# Patient Record
Sex: MALE | Race: WHITE | NOT HISPANIC OR LATINO | URBAN - METROPOLITAN AREA
[De-identification: names, ages, dates, MRNs, and addresses within clinical notes are randomized per-mention and may not be internally consistent; named-entity substitution may affect disease eponyms.]

---

## 2017-03-08 ENCOUNTER — INPATIENT (INPATIENT)
Facility: HOSPITAL | Age: 72
LOS: 4 days | Discharge: HOME | End: 2017-03-13
Payer: MEDICARE

## 2017-03-08 ENCOUNTER — OUTPATIENT (OUTPATIENT)
Dept: OUTPATIENT SERVICES | Facility: HOSPITAL | Age: 72
LOS: 1 days | End: 2017-03-08

## 2017-03-08 PROCEDURE — 99284 EMERGENCY DEPT VISIT MOD MDM: CPT

## 2017-03-08 PROCEDURE — 71250 CT THORAX DX C-: CPT | Mod: 26

## 2017-03-08 PROCEDURE — 71020: CPT | Mod: 26

## 2017-03-09 ENCOUNTER — OUTPATIENT (OUTPATIENT)
Dept: OUTPATIENT SERVICES | Facility: HOSPITAL | Age: 72
LOS: 1 days | End: 2017-03-09

## 2017-03-10 ENCOUNTER — OUTPATIENT (OUTPATIENT)
Dept: OUTPATIENT SERVICES | Facility: HOSPITAL | Age: 72
LOS: 1 days | End: 2017-03-10

## 2017-03-11 ENCOUNTER — OUTPATIENT (OUTPATIENT)
Dept: OUTPATIENT SERVICES | Facility: HOSPITAL | Age: 72
LOS: 1 days | End: 2017-03-11

## 2017-03-12 ENCOUNTER — OUTPATIENT (OUTPATIENT)
Dept: OUTPATIENT SERVICES | Facility: HOSPITAL | Age: 72
LOS: 1 days | End: 2017-03-12

## 2017-03-12 PROCEDURE — 71020: CPT | Mod: 26

## 2017-03-13 ENCOUNTER — OUTPATIENT (OUTPATIENT)
Dept: OUTPATIENT SERVICES | Facility: HOSPITAL | Age: 72
LOS: 1 days | End: 2017-03-13

## 2017-11-29 PROCEDURE — 71020: CPT | Mod: 26

## 2017-11-29 PROCEDURE — 99284 EMERGENCY DEPT VISIT MOD MDM: CPT

## 2017-11-30 ENCOUNTER — INPATIENT (INPATIENT)
Facility: HOSPITAL | Age: 72
LOS: 5 days | Discharge: NURSING, REHAB OR LONG-TERM CARE | End: 2017-12-06
Payer: MEDICARE

## 2017-11-30 ENCOUNTER — OUTPATIENT (OUTPATIENT)
Dept: OUTPATIENT SERVICES | Facility: HOSPITAL | Age: 72
LOS: 1 days | End: 2017-11-30

## 2017-11-30 PROCEDURE — 70450 CT HEAD/BRAIN W/O DYE: CPT | Mod: 26

## 2017-12-01 ENCOUNTER — OUTPATIENT (OUTPATIENT)
Dept: OUTPATIENT SERVICES | Facility: HOSPITAL | Age: 72
LOS: 1 days | End: 2017-12-01

## 2017-12-02 ENCOUNTER — OUTPATIENT (OUTPATIENT)
Dept: OUTPATIENT SERVICES | Facility: HOSPITAL | Age: 72
LOS: 1 days | End: 2017-12-02

## 2017-12-03 ENCOUNTER — OUTPATIENT (OUTPATIENT)
Dept: OUTPATIENT SERVICES | Facility: HOSPITAL | Age: 72
LOS: 1 days | End: 2017-12-03

## 2017-12-04 ENCOUNTER — OUTPATIENT (OUTPATIENT)
Dept: OUTPATIENT SERVICES | Facility: HOSPITAL | Age: 72
LOS: 1 days | End: 2017-12-04

## 2017-12-05 ENCOUNTER — OUTPATIENT (OUTPATIENT)
Dept: OUTPATIENT SERVICES | Facility: HOSPITAL | Age: 72
LOS: 1 days | End: 2017-12-05

## 2017-12-05 PROCEDURE — 74230 X-RAY XM SWLNG FUNCJ C+: CPT | Mod: 26

## 2017-12-06 ENCOUNTER — OUTPATIENT (OUTPATIENT)
Dept: OUTPATIENT SERVICES | Facility: HOSPITAL | Age: 72
LOS: 1 days | End: 2017-12-06

## 2018-04-22 ENCOUNTER — OUTPATIENT (OUTPATIENT)
Dept: OUTPATIENT SERVICES | Facility: HOSPITAL | Age: 73
LOS: 1 days | End: 2018-04-22

## 2018-04-22 PROCEDURE — 99285 EMERGENCY DEPT VISIT HI MDM: CPT

## 2018-04-22 PROCEDURE — 71045 X-RAY EXAM CHEST 1 VIEW: CPT | Mod: 26

## 2018-04-22 PROCEDURE — 70450 CT HEAD/BRAIN W/O DYE: CPT | Mod: 26

## 2018-04-22 PROCEDURE — 76700 US EXAM ABDOM COMPLETE: CPT | Mod: 26

## 2018-04-23 ENCOUNTER — OUTPATIENT (OUTPATIENT)
Dept: OUTPATIENT SERVICES | Facility: HOSPITAL | Age: 73
LOS: 1 days | End: 2018-04-23

## 2018-04-23 ENCOUNTER — INPATIENT (INPATIENT)
Facility: HOSPITAL | Age: 73
LOS: 8 days | Discharge: NURSING, REHAB OR LONG-TERM CARE | End: 2018-05-02
Payer: MEDICARE

## 2018-04-24 ENCOUNTER — OUTPATIENT (OUTPATIENT)
Dept: OUTPATIENT SERVICES | Facility: HOSPITAL | Age: 73
LOS: 1 days | End: 2018-04-24

## 2018-04-25 ENCOUNTER — OUTPATIENT (OUTPATIENT)
Dept: OUTPATIENT SERVICES | Facility: HOSPITAL | Age: 73
LOS: 1 days | End: 2018-04-25

## 2018-04-26 ENCOUNTER — OUTPATIENT (OUTPATIENT)
Dept: OUTPATIENT SERVICES | Facility: HOSPITAL | Age: 73
LOS: 1 days | End: 2018-04-26

## 2018-04-26 PROCEDURE — 74230 X-RAY XM SWLNG FUNCJ C+: CPT | Mod: 26

## 2018-04-27 ENCOUNTER — OUTPATIENT (OUTPATIENT)
Dept: OUTPATIENT SERVICES | Facility: HOSPITAL | Age: 73
LOS: 1 days | End: 2018-04-27

## 2018-04-27 PROCEDURE — 76856 US EXAM PELVIC COMPLETE: CPT | Mod: 26

## 2018-04-27 PROCEDURE — 71045 X-RAY EXAM CHEST 1 VIEW: CPT | Mod: 26

## 2018-04-27 PROCEDURE — 76770 US EXAM ABDO BACK WALL COMP: CPT | Mod: 26

## 2018-04-28 ENCOUNTER — OUTPATIENT (OUTPATIENT)
Dept: OUTPATIENT SERVICES | Facility: HOSPITAL | Age: 73
LOS: 1 days | End: 2018-04-28

## 2018-04-28 PROCEDURE — 73110 X-RAY EXAM OF WRIST: CPT | Mod: 26,LT

## 2018-04-29 ENCOUNTER — OUTPATIENT (OUTPATIENT)
Dept: OUTPATIENT SERVICES | Facility: HOSPITAL | Age: 73
LOS: 1 days | End: 2018-04-29

## 2018-04-30 ENCOUNTER — OUTPATIENT (OUTPATIENT)
Dept: OUTPATIENT SERVICES | Facility: HOSPITAL | Age: 73
LOS: 1 days | End: 2018-04-30

## 2018-05-01 ENCOUNTER — OUTPATIENT (OUTPATIENT)
Dept: OUTPATIENT SERVICES | Facility: HOSPITAL | Age: 73
LOS: 1 days | End: 2018-05-01

## 2018-05-02 ENCOUNTER — OUTPATIENT (OUTPATIENT)
Dept: OUTPATIENT SERVICES | Facility: HOSPITAL | Age: 73
LOS: 1 days | End: 2018-05-02

## 2018-09-03 ENCOUNTER — OUTPATIENT (OUTPATIENT)
Dept: OUTPATIENT SERVICES | Facility: HOSPITAL | Age: 73
LOS: 1 days | End: 2018-09-03

## 2018-09-03 ENCOUNTER — INPATIENT (INPATIENT)
Facility: HOSPITAL | Age: 73
LOS: 7 days | Discharge: NURSING, REHAB OR LONG-TERM CARE | End: 2018-09-11
Payer: MEDICARE

## 2018-09-03 PROCEDURE — 99285 EMERGENCY DEPT VISIT HI MDM: CPT

## 2018-09-03 PROCEDURE — 71045 X-RAY EXAM CHEST 1 VIEW: CPT | Mod: 26

## 2018-09-04 ENCOUNTER — OUTPATIENT (OUTPATIENT)
Dept: OUTPATIENT SERVICES | Facility: HOSPITAL | Age: 73
LOS: 1 days | End: 2018-09-04

## 2018-09-04 PROCEDURE — 70450 CT HEAD/BRAIN W/O DYE: CPT | Mod: 26

## 2018-09-05 ENCOUNTER — OUTPATIENT (OUTPATIENT)
Dept: OUTPATIENT SERVICES | Facility: HOSPITAL | Age: 73
LOS: 1 days | End: 2018-09-05

## 2018-09-06 ENCOUNTER — OUTPATIENT (OUTPATIENT)
Dept: OUTPATIENT SERVICES | Facility: HOSPITAL | Age: 73
LOS: 1 days | End: 2018-09-06

## 2018-09-07 ENCOUNTER — OUTPATIENT (OUTPATIENT)
Dept: OUTPATIENT SERVICES | Facility: HOSPITAL | Age: 73
LOS: 1 days | End: 2018-09-07

## 2018-09-08 ENCOUNTER — OUTPATIENT (OUTPATIENT)
Dept: OUTPATIENT SERVICES | Facility: HOSPITAL | Age: 73
LOS: 1 days | End: 2018-09-08

## 2018-09-08 PROCEDURE — 71046 X-RAY EXAM CHEST 2 VIEWS: CPT | Mod: 26

## 2018-09-09 ENCOUNTER — OUTPATIENT (OUTPATIENT)
Dept: OUTPATIENT SERVICES | Facility: HOSPITAL | Age: 73
LOS: 1 days | End: 2018-09-09

## 2018-09-10 ENCOUNTER — OUTPATIENT (OUTPATIENT)
Dept: OUTPATIENT SERVICES | Facility: HOSPITAL | Age: 73
LOS: 1 days | End: 2018-09-10

## 2018-11-23 ENCOUNTER — INPATIENT (INPATIENT)
Facility: HOSPITAL | Age: 73
LOS: 5 days | Discharge: NURSING, REHAB OR LONG-TERM CARE | End: 2018-11-29
Admitting: INTERNAL MEDICINE
Payer: MEDICARE

## 2018-11-23 ENCOUNTER — OUTPATIENT (OUTPATIENT)
Dept: OUTPATIENT SERVICES | Facility: HOSPITAL | Age: 73
LOS: 1 days | End: 2018-11-23

## 2018-11-23 PROCEDURE — 99285 EMERGENCY DEPT VISIT HI MDM: CPT

## 2018-11-23 PROCEDURE — 71045 X-RAY EXAM CHEST 1 VIEW: CPT | Mod: 26

## 2018-11-23 PROCEDURE — 70450 CT HEAD/BRAIN W/O DYE: CPT | Mod: 26

## 2018-11-23 PROCEDURE — 72170 X-RAY EXAM OF PELVIS: CPT | Mod: 26

## 2018-11-24 ENCOUNTER — OUTPATIENT (OUTPATIENT)
Dept: OUTPATIENT SERVICES | Facility: HOSPITAL | Age: 73
LOS: 1 days | End: 2018-11-24

## 2018-11-25 ENCOUNTER — OUTPATIENT (OUTPATIENT)
Dept: OUTPATIENT SERVICES | Facility: HOSPITAL | Age: 73
LOS: 1 days | End: 2018-11-25

## 2018-11-26 ENCOUNTER — OUTPATIENT (OUTPATIENT)
Dept: OUTPATIENT SERVICES | Facility: HOSPITAL | Age: 73
LOS: 1 days | End: 2018-11-26

## 2018-11-26 PROCEDURE — 76857 US EXAM PELVIC LIMITED: CPT | Mod: 26

## 2018-11-27 ENCOUNTER — OUTPATIENT (OUTPATIENT)
Dept: OUTPATIENT SERVICES | Facility: HOSPITAL | Age: 73
LOS: 1 days | End: 2018-11-27

## 2018-11-28 ENCOUNTER — OUTPATIENT (OUTPATIENT)
Dept: OUTPATIENT SERVICES | Facility: HOSPITAL | Age: 73
LOS: 1 days | End: 2018-11-28

## 2018-11-29 ENCOUNTER — OUTPATIENT (OUTPATIENT)
Dept: OUTPATIENT SERVICES | Facility: HOSPITAL | Age: 73
LOS: 1 days | End: 2018-11-29

## 2019-01-01 ENCOUNTER — INPATIENT (INPATIENT)
Facility: HOSPITAL | Age: 74
LOS: 7 days | Discharge: NURSING, REHAB OR LONG-TERM CARE | End: 2019-05-29
Payer: MEDICARE

## 2019-01-01 ENCOUNTER — OUTPATIENT (OUTPATIENT)
Dept: OUTPATIENT SERVICES | Facility: HOSPITAL | Age: 74
LOS: 1 days | End: 2019-01-01

## 2019-01-01 ENCOUNTER — INPATIENT (INPATIENT)
Facility: HOSPITAL | Age: 74
LOS: 3 days | Discharge: NURSING, REHAB OR LONG-TERM CARE | End: 2019-08-13
Payer: MEDICARE

## 2019-01-01 ENCOUNTER — INPATIENT (INPATIENT)
Facility: HOSPITAL | Age: 74
LOS: 8 days | Discharge: NURSING, REHAB OR LONG-TERM CARE | End: 2020-01-02
Admitting: STUDENT IN AN ORGANIZED HEALTH CARE EDUCATION/TRAINING PROGRAM
Payer: MEDICARE

## 2019-01-01 DIAGNOSIS — Z98.890 OTHER SPECIFIED POSTPROCEDURAL STATES: Chronic | ICD-10-CM

## 2019-01-01 PROCEDURE — 70450 CT HEAD/BRAIN W/O DYE: CPT | Mod: 26

## 2019-01-01 PROCEDURE — 99285 EMERGENCY DEPT VISIT HI MDM: CPT

## 2019-01-01 PROCEDURE — 71260 CT THORAX DX C+: CPT | Mod: 26

## 2019-01-01 PROCEDURE — 74230 X-RAY XM SWLNG FUNCJ C+: CPT | Mod: 26

## 2019-01-01 PROCEDURE — 93010 ELECTROCARDIOGRAM REPORT: CPT

## 2019-01-01 PROCEDURE — 72170 X-RAY EXAM OF PELVIS: CPT | Mod: 26

## 2019-01-01 PROCEDURE — 74177 CT ABD & PELVIS W/CONTRAST: CPT | Mod: 26

## 2019-01-01 PROCEDURE — 71275 CT ANGIOGRAPHY CHEST: CPT | Mod: 26

## 2019-01-01 PROCEDURE — 71046 X-RAY EXAM CHEST 2 VIEWS: CPT | Mod: 26

## 2019-01-01 PROCEDURE — 76705 ECHO EXAM OF ABDOMEN: CPT | Mod: 26

## 2019-01-01 PROCEDURE — 71045 X-RAY EXAM CHEST 1 VIEW: CPT | Mod: 26

## 2019-01-01 PROCEDURE — 72125 CT NECK SPINE W/O DYE: CPT | Mod: 26

## 2019-03-05 PROCEDURE — 71045 X-RAY EXAM CHEST 1 VIEW: CPT | Mod: 26

## 2019-03-05 PROCEDURE — 99285 EMERGENCY DEPT VISIT HI MDM: CPT

## 2019-03-05 PROCEDURE — 72125 CT NECK SPINE W/O DYE: CPT | Mod: 26

## 2019-03-05 PROCEDURE — 70450 CT HEAD/BRAIN W/O DYE: CPT | Mod: 26

## 2019-03-05 PROCEDURE — 73502 X-RAY EXAM HIP UNI 2-3 VIEWS: CPT | Mod: 26,RT

## 2019-03-05 PROCEDURE — 72192 CT PELVIS W/O DYE: CPT | Mod: 26

## 2019-03-06 ENCOUNTER — INPATIENT (INPATIENT)
Facility: HOSPITAL | Age: 74
LOS: 7 days | Discharge: NURSING, REHAB OR LONG-TERM CARE | DRG: 853 | End: 2019-03-14
Attending: SURGERY | Admitting: SURGERY
Payer: MEDICARE

## 2019-03-06 ENCOUNTER — INPATIENT (INPATIENT)
Facility: HOSPITAL | Age: 74
LOS: 0 days | Discharge: ANOTHER HOSPITAL | End: 2019-03-06
Attending: SURGERY | Admitting: SURGERY
Payer: MEDICARE

## 2019-03-06 ENCOUNTER — OUTPATIENT (OUTPATIENT)
Dept: OUTPATIENT SERVICES | Facility: HOSPITAL | Age: 74
LOS: 1 days | End: 2019-03-06

## 2019-03-06 VITALS
OXYGEN SATURATION: 92 % | DIASTOLIC BLOOD PRESSURE: 69 MMHG | TEMPERATURE: 98 F | HEART RATE: 89 BPM | SYSTOLIC BLOOD PRESSURE: 126 MMHG | RESPIRATION RATE: 25 BRPM

## 2019-03-06 DIAGNOSIS — A41.9 SEPSIS, UNSPECIFIED ORGANISM: ICD-10-CM

## 2019-03-06 DIAGNOSIS — Z98.890 OTHER SPECIFIED POSTPROCEDURAL STATES: Chronic | ICD-10-CM

## 2019-03-06 LAB
ALBUMIN SERPL ELPH-MCNC: 3.2 G/DL — LOW (ref 3.3–5.2)
ALP SERPL-CCNC: 62 U/L — SIGNIFICANT CHANGE UP (ref 40–120)
ALT FLD-CCNC: 23 U/L — SIGNIFICANT CHANGE UP
ANION GAP SERPL CALC-SCNC: 12 MMOL/L — SIGNIFICANT CHANGE UP (ref 5–17)
APTT BLD: 25.3 SEC — LOW (ref 27.5–36.3)
AST SERPL-CCNC: 42 U/L — HIGH
BASOPHILS # BLD AUTO: 0 K/UL — SIGNIFICANT CHANGE UP (ref 0–0.2)
BASOPHILS NFR BLD AUTO: 0.1 % — SIGNIFICANT CHANGE UP (ref 0–2)
BILIRUB DIRECT SERPL-MCNC: 0.4 MG/DL — HIGH (ref 0–0.3)
BILIRUB INDIRECT FLD-MCNC: 1 MG/DL — SIGNIFICANT CHANGE UP (ref 0.2–1)
BILIRUB SERPL-MCNC: 1.4 MG/DL — SIGNIFICANT CHANGE UP (ref 0.4–2)
BUN SERPL-MCNC: 30 MG/DL — HIGH (ref 8–20)
CALCIUM SERPL-MCNC: 8.2 MG/DL — LOW (ref 8.6–10.2)
CHLORIDE SERPL-SCNC: 102 MMOL/L — SIGNIFICANT CHANGE UP (ref 98–107)
CK MB CFR SERPL CALC: 10.1 NG/ML — HIGH (ref 0–6.7)
CK SERPL-CCNC: 691 U/L — HIGH (ref 30–200)
CO2 SERPL-SCNC: 24 MMOL/L — SIGNIFICANT CHANGE UP (ref 22–29)
CREAT SERPL-MCNC: 0.52 MG/DL — SIGNIFICANT CHANGE UP (ref 0.5–1.3)
EOSINOPHIL # BLD AUTO: 0 K/UL — SIGNIFICANT CHANGE UP (ref 0–0.5)
EOSINOPHIL NFR BLD AUTO: 0 % — SIGNIFICANT CHANGE UP (ref 0–5)
GAS PNL BLDA: SIGNIFICANT CHANGE UP
GLUCOSE SERPL-MCNC: 140 MG/DL — HIGH (ref 70–115)
HCT VFR BLD CALC: 34.3 % — LOW (ref 42–52)
HGB BLD-MCNC: 11.6 G/DL — LOW (ref 14–18)
INR BLD: 0.89 RATIO — SIGNIFICANT CHANGE UP (ref 0.88–1.16)
LYMPHOCYTES # BLD AUTO: 0.8 K/UL — LOW (ref 1–4.8)
LYMPHOCYTES # BLD AUTO: 8.1 % — LOW (ref 20–55)
MAGNESIUM SERPL-MCNC: 1.9 MG/DL — SIGNIFICANT CHANGE UP (ref 1.6–2.6)
MCHC RBC-ENTMCNC: 31.1 PG — HIGH (ref 27–31)
MCHC RBC-ENTMCNC: 33.8 G/DL — SIGNIFICANT CHANGE UP (ref 32–36)
MCV RBC AUTO: 92 FL — SIGNIFICANT CHANGE UP (ref 80–94)
MONOCYTES # BLD AUTO: 0.4 K/UL — SIGNIFICANT CHANGE UP (ref 0–0.8)
MONOCYTES NFR BLD AUTO: 4.7 % — SIGNIFICANT CHANGE UP (ref 3–10)
NEUTROPHILS # BLD AUTO: 8.3 K/UL — HIGH (ref 1.8–8)
NEUTROPHILS NFR BLD AUTO: 86.7 % — HIGH (ref 37–73)
PHOSPHATE SERPL-MCNC: 2.2 MG/DL — LOW (ref 2.4–4.7)
PLATELET # BLD AUTO: 119 K/UL — LOW (ref 150–400)
POTASSIUM SERPL-MCNC: 3.6 MMOL/L — SIGNIFICANT CHANGE UP (ref 3.5–5.3)
POTASSIUM SERPL-SCNC: 3.6 MMOL/L — SIGNIFICANT CHANGE UP (ref 3.5–5.3)
PROCALCITONIN SERPL-MCNC: 0.15 NG/ML — HIGH (ref 0–0.04)
PROT SERPL-MCNC: 5.5 G/DL — LOW (ref 6.6–8.7)
PROTHROM AB SERPL-ACNC: 10.2 SEC — SIGNIFICANT CHANGE UP (ref 10–12.9)
RBC # BLD: 3.73 M/UL — LOW (ref 4.6–6.2)
RBC # FLD: 16.5 % — HIGH (ref 11–15.6)
SODIUM SERPL-SCNC: 138 MMOL/L — SIGNIFICANT CHANGE UP (ref 135–145)
TROPONIN T SERPL-MCNC: <0.01 NG/ML — SIGNIFICANT CHANGE UP (ref 0–0.06)
WBC # BLD: 9.6 K/UL — SIGNIFICANT CHANGE UP (ref 4.8–10.8)
WBC # FLD AUTO: 9.6 K/UL — SIGNIFICANT CHANGE UP (ref 4.8–10.8)

## 2019-03-06 PROCEDURE — 71045 X-RAY EXAM CHEST 1 VIEW: CPT | Mod: 26

## 2019-03-06 PROCEDURE — 74177 CT ABD & PELVIS W/CONTRAST: CPT | Mod: 26

## 2019-03-06 PROCEDURE — 71260 CT THORAX DX C+: CPT | Mod: 26

## 2019-03-06 PROCEDURE — 74018 RADEX ABDOMEN 1 VIEW: CPT | Mod: 26

## 2019-03-06 PROCEDURE — 99223 1ST HOSP IP/OBS HIGH 75: CPT

## 2019-03-06 PROCEDURE — 93010 ELECTROCARDIOGRAM REPORT: CPT | Mod: 59

## 2019-03-06 PROCEDURE — 99232 SBSQ HOSP IP/OBS MODERATE 35: CPT

## 2019-03-06 PROCEDURE — 76775 US EXAM ABDO BACK WALL LIM: CPT | Mod: 26

## 2019-03-06 PROCEDURE — 99221 1ST HOSP IP/OBS SF/LOW 40: CPT

## 2019-03-06 PROCEDURE — 93010 ELECTROCARDIOGRAM REPORT: CPT

## 2019-03-06 RX ORDER — FOLIC ACID 0.8 MG
1 TABLET ORAL DAILY
Qty: 0 | Refills: 0 | Status: DISCONTINUED | OUTPATIENT
Start: 2019-03-06 | End: 2019-03-06

## 2019-03-06 RX ORDER — NICOTINE POLACRILEX 2 MG
1 GUM BUCCAL DAILY
Qty: 0 | Refills: 0 | Status: DISCONTINUED | OUTPATIENT
Start: 2019-03-06 | End: 2019-03-14

## 2019-03-06 RX ORDER — DEXTROSE 50 % IN WATER 50 %
25 SYRINGE (ML) INTRAVENOUS ONCE
Qty: 0 | Refills: 0 | Status: DISCONTINUED | OUTPATIENT
Start: 2019-03-06 | End: 2019-03-09

## 2019-03-06 RX ORDER — ENOXAPARIN SODIUM 100 MG/ML
30 INJECTION SUBCUTANEOUS EVERY 12 HOURS
Qty: 0 | Refills: 0 | Status: DISCONTINUED | OUTPATIENT
Start: 2019-03-06 | End: 2019-03-14

## 2019-03-06 RX ORDER — THIAMINE MONONITRATE (VIT B1) 100 MG
500 TABLET ORAL DAILY
Qty: 0 | Refills: 0 | Status: DISCONTINUED | OUTPATIENT
Start: 2019-03-06 | End: 2019-03-07

## 2019-03-06 RX ORDER — CHLORHEXIDINE GLUCONATE 213 G/1000ML
1 SOLUTION TOPICAL DAILY
Qty: 0 | Refills: 0 | Status: DISCONTINUED | OUTPATIENT
Start: 2019-03-06 | End: 2019-03-11

## 2019-03-06 RX ORDER — DEXMEDETOMIDINE HYDROCHLORIDE IN 0.9% SODIUM CHLORIDE 4 UG/ML
0.3 INJECTION INTRAVENOUS
Qty: 200 | Refills: 0 | Status: DISCONTINUED | OUTPATIENT
Start: 2019-03-06 | End: 2019-03-09

## 2019-03-06 RX ORDER — POTASSIUM CHLORIDE 20 MEQ
10 PACKET (EA) ORAL
Qty: 0 | Refills: 0 | Status: COMPLETED | OUTPATIENT
Start: 2019-03-06 | End: 2019-03-06

## 2019-03-06 RX ORDER — DEXTROSE 50 % IN WATER 50 %
15 SYRINGE (ML) INTRAVENOUS ONCE
Qty: 0 | Refills: 0 | Status: DISCONTINUED | OUTPATIENT
Start: 2019-03-06 | End: 2019-03-09

## 2019-03-06 RX ORDER — DILTIAZEM HCL 120 MG
10 CAPSULE, EXT RELEASE 24 HR ORAL
Qty: 125 | Refills: 0 | Status: DISCONTINUED | OUTPATIENT
Start: 2019-03-06 | End: 2019-03-06

## 2019-03-06 RX ORDER — AMIODARONE HYDROCHLORIDE 400 MG/1
1 TABLET ORAL
Qty: 900 | Refills: 0 | Status: DISCONTINUED | OUTPATIENT
Start: 2019-03-06 | End: 2019-03-06

## 2019-03-06 RX ORDER — POTASSIUM PHOSPHATE, MONOBASIC POTASSIUM PHOSPHATE, DIBASIC 236; 224 MG/ML; MG/ML
30 INJECTION, SOLUTION INTRAVENOUS ONCE
Qty: 0 | Refills: 0 | Status: COMPLETED | OUTPATIENT
Start: 2019-03-06 | End: 2019-03-06

## 2019-03-06 RX ORDER — AMIODARONE HYDROCHLORIDE 400 MG/1
0.5 TABLET ORAL
Qty: 900 | Refills: 0 | Status: DISCONTINUED | OUTPATIENT
Start: 2019-03-06 | End: 2019-03-09

## 2019-03-06 RX ORDER — FOLIC ACID 0.8 MG
1 TABLET ORAL DAILY
Qty: 0 | Refills: 0 | Status: DISCONTINUED | OUTPATIENT
Start: 2019-03-06 | End: 2019-03-10

## 2019-03-06 RX ORDER — THIAMINE MONONITRATE (VIT B1) 100 MG
500 TABLET ORAL ONCE
Qty: 0 | Refills: 0 | Status: COMPLETED | OUTPATIENT
Start: 2019-03-06 | End: 2019-03-06

## 2019-03-06 RX ORDER — SODIUM CHLORIDE 9 MG/ML
1000 INJECTION, SOLUTION INTRAVENOUS
Qty: 0 | Refills: 0 | Status: DISCONTINUED | OUTPATIENT
Start: 2019-03-06 | End: 2019-03-09

## 2019-03-06 RX ORDER — IPRATROPIUM/ALBUTEROL SULFATE 18-103MCG
3 AEROSOL WITH ADAPTER (GRAM) INHALATION EVERY 6 HOURS
Qty: 0 | Refills: 0 | Status: DISCONTINUED | OUTPATIENT
Start: 2019-03-06 | End: 2019-03-11

## 2019-03-06 RX ORDER — PIPERACILLIN AND TAZOBACTAM 4; .5 G/20ML; G/20ML
3.38 INJECTION, POWDER, LYOPHILIZED, FOR SOLUTION INTRAVENOUS EVERY 8 HOURS
Qty: 0 | Refills: 0 | Status: DISCONTINUED | OUTPATIENT
Start: 2019-03-06 | End: 2019-03-11

## 2019-03-06 RX ORDER — INSULIN LISPRO 100/ML
VIAL (ML) SUBCUTANEOUS
Qty: 0 | Refills: 0 | Status: DISCONTINUED | OUTPATIENT
Start: 2019-03-06 | End: 2019-03-13

## 2019-03-06 RX ORDER — HYDROMORPHONE HYDROCHLORIDE 2 MG/ML
0.5 INJECTION INTRAMUSCULAR; INTRAVENOUS; SUBCUTANEOUS ONCE
Qty: 0 | Refills: 0 | Status: DISCONTINUED | OUTPATIENT
Start: 2019-03-06 | End: 2019-03-06

## 2019-03-06 RX ORDER — MAGNESIUM SULFATE 500 MG/ML
2 VIAL (ML) INJECTION ONCE
Qty: 0 | Refills: 0 | Status: COMPLETED | OUTPATIENT
Start: 2019-03-06 | End: 2019-03-06

## 2019-03-06 RX ORDER — GLUCAGON INJECTION, SOLUTION 0.5 MG/.1ML
1 INJECTION, SOLUTION SUBCUTANEOUS ONCE
Qty: 0 | Refills: 0 | Status: DISCONTINUED | OUTPATIENT
Start: 2019-03-06 | End: 2019-03-09

## 2019-03-06 RX ORDER — DEXTROSE 50 % IN WATER 50 %
12.5 SYRINGE (ML) INTRAVENOUS ONCE
Qty: 0 | Refills: 0 | Status: DISCONTINUED | OUTPATIENT
Start: 2019-03-06 | End: 2019-03-09

## 2019-03-06 RX ADMIN — Medication 100 MILLIEQUIVALENT(S): at 22:49

## 2019-03-06 RX ADMIN — SODIUM CHLORIDE 120 MILLILITER(S): 9 INJECTION, SOLUTION INTRAVENOUS at 17:56

## 2019-03-06 RX ADMIN — Medication 100 MILLIEQUIVALENT(S): at 21:47

## 2019-03-06 RX ADMIN — Medication 50 MILLIGRAM(S): at 20:29

## 2019-03-06 RX ADMIN — Medication 1 PATCH: at 22:13

## 2019-03-06 RX ADMIN — Medication 10 MG/HR: at 17:55

## 2019-03-06 RX ADMIN — Medication 105 MILLIGRAM(S): at 17:30

## 2019-03-06 RX ADMIN — Medication 100 MILLIEQUIVALENT(S): at 20:29

## 2019-03-06 RX ADMIN — Medication 3 MILLILITER(S): at 20:05

## 2019-03-06 RX ADMIN — HYDROMORPHONE HYDROCHLORIDE 0.5 MILLIGRAM(S): 2 INJECTION INTRAMUSCULAR; INTRAVENOUS; SUBCUTANEOUS at 23:54

## 2019-03-06 RX ADMIN — PIPERACILLIN AND TAZOBACTAM 25 GRAM(S): 4; .5 INJECTION, POWDER, LYOPHILIZED, FOR SOLUTION INTRAVENOUS at 20:30

## 2019-03-06 RX ADMIN — AMIODARONE HYDROCHLORIDE 16.67 MG/MIN: 400 TABLET ORAL at 20:40

## 2019-03-06 RX ADMIN — POTASSIUM PHOSPHATE, MONOBASIC POTASSIUM PHOSPHATE, DIBASIC 83.33 MILLIMOLE(S): 236; 224 INJECTION, SOLUTION INTRAVENOUS at 22:48

## 2019-03-06 RX ADMIN — AMIODARONE HYDROCHLORIDE 33.33 MG/MIN: 400 TABLET ORAL at 17:56

## 2019-03-06 RX ADMIN — Medication 50 GRAM(S): at 18:50

## 2019-03-06 NOTE — H&P ADULT - ASSESSMENT
73 year old male s/p found down for unknown duration found to be septic likely from pyelonephritis, new onset atrial fibrillation with RVR and traumatic injuries of lumbar spine fracture and right femoral neck fracture     neuro: alcohol dependence; lumbar spine compression fracture. AMS (unable to assess baseline) - CIWA protocol. maintain cervical collar until sober. f/u spine consultation   CV: afib with RVR; amiodarone/cardizem drip, maintain MAP >65  pulm: maintain saturation >95%; supplemental oxygen as needed, ABG  GI: left inguinal hernia, non-obstructed; NPO  : pyelonephritis; velazquez. f/u urology consultation/IR for possible percutaneous nephrostomy placement  endo: no issues; ISS  heme/DVT: SCDs, hold chemical prophylaxis  ID: sepsis; repeat labs, broad spectrum antibiotics, blood cultures, repeat lactate  tubes: velazquez  lines: PIV  skin: f/u ortho for R femoral neck fracture

## 2019-03-06 NOTE — CONSULT NOTE ADULT - SUBJECTIVE AND OBJECTIVE BOX
HPI:  73 year old male transfer from [facility] after patient was found down for unknown duration of time at home covered in feces. Noted to have a right femoral neck fracture and developed new onset atrial fibrillation with rapid ventricular rate. Subsequently started on a Cardizem and Amiodarone drip. Patient was also placed on a CIWA protocol due to known history of alcohol dependence. At outside hospital, patient was found to have a leukocytosis, tachypneic, tachycardic with a lactic acidosis.   Consultations made for neurosurgery, urology, cardiology  Neurosurgery recommended TLSO for L1 compression fracture  R femoral neck with mild comminution fracture  2cm left renal pelvic calculus with distended left renal pelvis with marked parapelvic edema and urothelial enhancement  L inguinal hernia    Patient transferred directly to [facility] ICU where primary survey was initiated. Patient complained of right hip pain upon movement.   Consult placed to Urology for 2cm renal pelvis stone.  Pt poor historian, unable to provide adequate HPI and review of systems although pt does complain of hip pain.        ICU Vital Signs Last 24 Hrs  T(C): 36.4 (06 Mar 2019 15:46), Max: 36.4 (06 Mar 2019 15:39)  T(F): 97.6 (06 Mar 2019 15:46), Max: 97.6 (06 Mar 2019 15:39)  HR: 89 (06 Mar 2019 15:46) (89 - 89)  BP: 126/69 (06 Mar 2019 15:46) (126/69 - 126/69)  BP(mean): 93 (06 Mar 2019 15:46) (88 - 93)  ABP: --  ABP(mean): --  RR: 25 (06 Mar 2019 15:46) (25 - 25)  SpO2: 92% (06 Mar 2019 15:46) (92% - 92%)      I&O's Detail      MEDICATIONS  (STANDING):  amiodarone Infusion 1 mG/Min (33.333 mL/Hr) IV Continuous <Continuous>  chlorhexidine 2% Cloths 1 Application(s) Topical daily  dextrose 5%. 1000 milliLiter(s) (50 mL/Hr) IV Continuous <Continuous>  dextrose 50% Injectable 12.5 Gram(s) IV Push once  dextrose 50% Injectable 25 Gram(s) IV Push once  dextrose 50% Injectable 25 Gram(s) IV Push once  diltiazem Infusion 10 mG/Hr (10 mL/Hr) IV Continuous <Continuous>  folic acid 1 milliGRAM(s) Oral daily  insulin lispro (HumaLOG) corrective regimen sliding scale   SubCutaneous three times a day before meals  multiple electrolytes Injection Type 1 1000 milliLiter(s) (120 mL/Hr) IV Continuous <Continuous>  multivitamin 1 Tablet(s) Oral daily  thiamine IVPB 500 milliGRAM(s) IV Intermittent once    MEDICATIONS  (PRN):  dextrose 40% Gel 15 Gram(s) Oral once PRN Blood Glucose LESS THAN 70 milliGRAM(s)/deciliter  glucagon  Injectable 1 milliGRAM(s) IntraMuscular once PRN Glucose LESS THAN 70 milligrams/deciliter      NUTRITION/IVF:  NPO/Plasmalyte @ 120cc/hr    VELAZQUEZ:  Yes      PHYSICAL EXAM:    Gen:  NAd, sitting upright    Eyes:  Right eye closed, opens to voice    Neurological:  A&O x 2    ENMT:  dry mucous membranes    Neck:  In cervical collar    Pulmonary:  Coarse BS Rt LL, clear through base Left lung field    Cardiovascular:  Afib rab controlled    Gastrointestinal:  Softly distended    Genitourinary:  Clear urine noted in velazquez    Back:  No CVAT appreciated BL    Extremities:  AFROM x4    Skin:  warm, dry      LABS:              RECENT CULTURES:            CAPILLARY BLOOD GLUCOSE      RADIOLOGY & ADDITIONAL STUDIES:    ASSESSMENT/PLAN:  73yMale presenting with:  2cm renal pelvis stone, pyelonephritis    Primary care per SICU team  Plan for IR perc nephrostomy tube today  Maintain velazquez  Monitor labs  Plasmalyte for hydration

## 2019-03-06 NOTE — CONSULT NOTE ADULT - SUBJECTIVE AND OBJECTIVE BOX
History of Present Illness:   73 year old male transfer from [facility] after patient was found down for unknown duration of time at home covered in feces. noted to have a right femoral neck fracture and developed new onset atrial fibrillation with rapid ventricular rate. Subsequently started on a cardizem and amiodarone drip. Patient was also placed on a CIWA protocol due to known history of alcohol dependence. At outside hospital, patient was found to have a leukocytosis, tachypneic, tachycardic with a lactic acidosis.   R femoral neck with mild comminution fracture per trauma team no films available      Allergies and Intolerances:        Allergies:  	No Known Allergies:     Home Medications:   * Outpatient Medication Status not yet specified    Patient History:    Past Medical History:  Alcohol abuse with intoxication    Depression.     Past Surgical History:  H/O eye surgery.     Family History:  No pertinent family history in first degree relatives.     No Pertinent Family History in first degree relatives of: patient.     Social History:  Social History (marital status, living situation, occupation, tobacco use, alcohol and drug use, and sexual history): 90 pack  year smoker. drinks 15oz alcohol daily     Tobacco Screening:  · Core Measure Site	No  · Has the patient used tobacco in the past 30 days?	Yes     Risk Assessment:    Present on Admission:  Deep Venous Thrombosis	no   Pulmonary Embolus	no      Heart Failure:  Does this patient have a history of or has been diagnosed with heart failure? unknown.    All info from pt chart, pt unable to answer questions and follow commands upon examination       Physical Exam:  Physical Exam:   	pt lying in bed NAD cervical collar in place  	Chest: Chest wall is non-tender to palpation, no subQ emphysema or crepitus palpated  	Extremities: moving all extremities spontaneously except RLE, no point tenderness or deformity noted to upper or lower extremities b/l, no ecchymosis, no erythema, no lacerations, no abrasions  	Vascular: 2+ radial, femoral, and DP pulses b/l  	Neurological: A&O x 1; unable to assess pt not following command       Labs and Results:  Labs, Radiology, Cardiology, and Other Results: pelvic xray: mildly displaced subcapital femoral neck fracture (report from [facility])  	CT chest/abd/pelvis: L1 superior concave compression fracture. 2cm left renal pelvic calculus with distended left renal pelvis with marked left peripelvic edema and urothelial enhancement. compatible with left pyonephrosis. subcapital right femoral neck fracture. left inguinal hernia with nonobstructed small bowel  CT head/c spine: negative for traumatic injuries    A/P: 73 year old male s/p found down for unknown duration found to be septic likely from pyelonephritis, new onset atrial fibrillation with RVR and traumatic injuries of lumbar spine fracture and right femoral neck fracture     right hip fx xrays pending   lumbar spine compression fracture xrays pending  NWB  pain control   Plan to be discussed with Dr. Car for treatment of hip fx once appropriate imaging obtained    L1 compression fx  Plan to be discussed with Dr. Bowman when proper imaging has been obtained

## 2019-03-06 NOTE — H&P ADULT - HISTORY OF PRESENT ILLNESS
73 year old male transfer from [facility] after patient was found down for unknown duration of time at home covered in feces. noted to have a right femoral neck fracture and developed new onset atrial fibrillation with rapid ventricular rate. Subsequently started on a cardizem and amiodarone drip. Patient was also placed on a CIWA protocol due to known history of alcohol dependence. At outside hospital, patient was found to have a leukocytosis, tachypneic, tachycardic with a lactic acidosis.   Consultations made for neurosurgery, urology, cardiology  neurosurgery recommended TLSO for L1 compression fracture  R femoral neck with mild comminution fracture  2cm left renal pelvic calculus with distended left renal pelvis with marked parapelvic edema and urothelial enhancement  L inguinal hernia    Patient transferred directly to [facility] ICU where primary survey was initiated. patient complained of right hip pain upon movement.   A: airway intact  B: CTAB  C: central pulses, peripheral pulses 2+bilaterally  D: GCS 13(V4E3M6), pupils 3mm equal and reactive to light bilaterally  E: full exposure achieved with no gross deformities noted

## 2019-03-06 NOTE — PROGRESS NOTE ADULT - SUBJECTIVE AND OBJECTIVE BOX
AXEL ARGUELLES  [id number]  73y, Male    Sepsis      Patient is a 73y old  Male who presents with a chief complaint of s/p found down (06 Mar 2019 18:08)      HPI:  73 year old male transfer from [facility] after patient was found down for unknown duration of time at home covered in feces. noted to have a right femoral neck fracture and developed new onset atrial fibrillation with rapid ventricular rate. Subsequently started on a cardizem and amiodarone drip. Patient was also placed on a CIWA protocol due to known history of alcohol dependence. At outside hospital, patient was found to have a leukocytosis, tachypneic, tachycardic with a lactic acidosis.   Consultations made for neurosurgery, urology, cardiology  neurosurgery recommended TLSO for L1 compression fracture  R femoral neck with mild comminution fracture  2cm left renal pelvic calculus with distended left renal pelvis with marked parapelvic edema and urothelial enhancement  L inguinal hernia    Patient transferred directly to [facility] ICU where primary survey was initiated. patient complained of right hip pain upon movement.   A: airway intact  B: CTAB  C: central pulses, peripheral pulses 2+bilaterally  D: GCS 13(V4E3M6), pupils 3mm equal and reactive to light bilaterally  E: full exposure achieved with no gross deformities noted (06 Mar 2019 15:39)    Was told by [facility] that patient was felt to be obstructed by radiology. Had a lactate of 15 there on  arrival. Labs were pending at the time o decision to transfer.    Allergies    No Known Allergies    Intolerances        MEDICATIONS  (STANDING):  ALBUTerol/ipratropium for Nebulization 3 milliLiter(s) Nebulizer every 6 hours  amiodarone Infusion 1 mG/Min (33.333 mL/Hr) IV Continuous <Continuous>  chlorhexidine 2% Cloths 1 Application(s) Topical daily  dextrose 5%. 1000 milliLiter(s) (50 mL/Hr) IV Continuous <Continuous>  dextrose 50% Injectable 12.5 Gram(s) IV Push once  dextrose 50% Injectable 25 Gram(s) IV Push once  dextrose 50% Injectable 25 Gram(s) IV Push once  diltiazem Infusion 10 mG/Hr (10 mL/Hr) IV Continuous <Continuous>  enoxaparin Injectable 30 milliGRAM(s) SubCutaneous every 12 hours  folic acid 1 milliGRAM(s) Oral daily  insulin lispro (HumaLOG) corrective regimen sliding scale   SubCutaneous three times a day before meals  multiple electrolytes Injection Type 1 1000 milliLiter(s) (120 mL/Hr) IV Continuous <Continuous>  multivitamin 1 Tablet(s) Oral daily  piperacillin/tazobactam IVPB. 3.375 Gram(s) IV Intermittent every 8 hours  potassium chloride  10 mEq/100 mL IVPB 10 milliEquivalent(s) IV Intermittent every 1 hour  potassium phosphate IVPB 30 milliMole(s) IV Intermittent once    MEDICATIONS  (PRN):  dextrose 40% Gel 15 Gram(s) Oral once PRN Blood Glucose LESS THAN 70 milliGRAM(s)/deciliter  glucagon  Injectable 1 milliGRAM(s) IntraMuscular once PRN Glucose LESS THAN 70 milligrams/deciliter      PAST MEDICAL & SURGICAL HISTORY:  Depression  Alcohol abuse with intoxication  H/O eye surgery      I&O's Detail    06 Mar 2019 07:01  -  06 Mar 2019 19:26  --------------------------------------------------------  IN:    amiodarone Infusion: 66.6 mL    diltiazem Infusion: 30 mL    multiple electrolytes Injection Type 1: 360 mL  Total IN: 456.6 mL    OUT:    Indwelling Catheter - Urethral: 1200 mL  Total OUT: 1200 mL    Total NET: -743.4 mL          PE:    Vital Signs Last 24 Hrs  T(C): 36.4 (06 Mar 2019 15:46), Max: 36.4 (06 Mar 2019 15:39)  T(F): 97.6 (06 Mar 2019 15:46), Max: 97.6 (06 Mar 2019 15:39)  HR: 87 (06 Mar 2019 18:00) (85 - 89)  BP: 158/70 (06 Mar 2019 18:00) (124/64 - 158/70)  BP(mean): 101 (06 Mar 2019 18:00) (88 - 101)  RR: 22 (06 Mar 2019 18:00) (21 - 25)  SpO2: 95% (06 Mar 2019 18:00) (92% - 96%)    Daily     Daily Weight in k.5 (06 Mar 2019 15:46)    PHYSICAL EXAM:      Constitutional: lethargic    Eyes: not opening his eyes inspite of telling me he was    Respiratory: no acute respiratory distress    Cardiovascular: NSR    Gastrointestinal: no abdominal distention    Genitourinary: no CVA tenderness    Neurological: lethargic    Skin: no jaundice    Psychiatric: oriented to person only        Labs:                          11.6   9.6   )-----------( 119      ( 06 Mar 2019 16:58 )             34.3       03-06    138  |  102  |  30.0<H>  ----------------------------<  140<H>  3.6   |  24.0  |  0.52    Ca    8.2<L>      06 Mar 2019 16:58  Phos  2.2     03-06  Mg     1.9     -06    TPro  5.5<L>  /  Alb  3.2<L>  /  TBili  1.4  /  DBili  0.4<H>  /  AST  42<H>  /  ALT  23  /  AlkPhos  62  -          Impression:  CT reviewed.  Ultrasound reviewed  Left kidney stone, not septic and no need for intervention at this time. No hydronephrosis  EtOH abuse    Plan:    Once patient has recovered and mental status improves will discuss whether to intervene on left renal stone.  I am concerned about his compliance and overall health given his drinking history  Rudolph Estrada MD  19 @ 19:26

## 2019-03-06 NOTE — H&P ADULT - NSHPPHYSICALEXAM_GEN_ALL_CORE
Constitutional: Well-developed well nourished [male] [female] in no acute distress  HEENT: Head is normocephalic and atraumatic, maxillofacial structures stable, no blood or discharge from nares or oral cavity, no huff sign / raccoon eyes, EOMI b/l, pupils 3mm round and reactive to light b/l, no active drainage or redness  Neck: cervical collar in place, trachea midline  Respiratory: Breath sounds CTA b/l respirations are unlabored, crackles bilaterally, no accessory muscle use, no conversational dyspnea  Cardiovascular: Regular rate & rhythm, +S1, S2  Chest: Chest wall is non-tender to palpation, no subQ emphysema or crepitus palpated  Gastrointestinal: Abdomen soft, non-tender, non-distended, no rebound tenderness / guarding, no ecchymosis or external signs of abdominal trauma. left inguinal hernia soft, reducible no overlaying skin changes  Extremities: moving all extremities spontaneously except RLE, no point tenderness or deformity noted to upper or lower extremities b/l  Pelvis: stable  Vascular: 2+ radial, femoral, and DP pulses b/l  Neurological: GCS: 13 (3/4/6). A&O x 1; no gross sensory / motor / coordination deficits  Musculoskeletal: 5/5 strength of upper and LLE. decreased strenght in RLE secondary to pain  Back: no C/T/LS spine tenderness to palpation, no step-offs or signs of external trauma to the back

## 2019-03-06 NOTE — PATIENT PROFILE ADULT - NSTOBACCOWITHDRW_GEN_A_CORE_SD
intense desire for tobacco/bowel pattern changes/depression/difficulty concentrating/irritability/drowsiness

## 2019-03-06 NOTE — CHART NOTE - NSCHARTNOTEFT_GEN_A_CORE
Transferred from [facility] with pyelonephritis.  On CT abd/pelv: 3.2 cm left renal pelvic calculus with distended left renal pelvis with marked parapelvic edema and urothelial enhancement.  Consulted urology which recommended a percutaneous nephrostomy tube.  IR was consulted however, does not believe stone is obstructive because there is no evidence of hydronephrosis on the CT impression.  Dr. Estrada contacted at that time, will be in to see patient within the hour.  Renal ultrasound ordered to r/o hydronephrosis. Transferred from [facility] with pyelonephritis.  On CT abd/pelv: 3.2 cm left renal pelvic calculus with distended left renal pelvis with marked parapelvic edema and urothelial enhancement.  Consulted urology which recommended a percutaneous nephrostomy tube.  IR was consulted however, does not believe stone is obstructive because there is no evidence of hydronephrosis on the CT impression.  Dr. Estrada contacted at that time, will be in to see patient within the hour.  Stat renal ultrasound ordered to r/o hydronephrosis.

## 2019-03-06 NOTE — H&P ADULT - NSHPLABSRESULTS_GEN_ALL_CORE
pelvic xray: mildly displaced subcapital femoral neck fracture  CT chest/abd/pelvis: L1 superior concave compression fracture. 2cm left renal pelvic calculus with distended left renal pelvis with marked left peripelvic edema and urothelial enhancement. compatible with left pyonephrosis. subcapital right femoral neck fracture. left inguinal hernia with nonobstructed small bowel  CT head/c spine: negative for traumatic injuries

## 2019-03-06 NOTE — CONSULT NOTE ADULT - ATTENDING COMMENTS
With regard to his L1 compression fracture I think LSO bracing as appropriate once patient is out of the ICU sepsis has been deemed negative and believe this patient will have an MRI of the lumbar spine as well as thoracic spine to make sure there is no adjacent level/additional fractures. Unlikely that this gentleman will require spinal surgery at this point in time. Patient be followed on a daily basis for his L1 compression fracture With regard to his L1 compression fracture I think LSO bracing as appropriate once patient is out of the ICU sepsis has been deemed negative and believe this patient will have an MRI of the lumbar spine as well as thoracic spine to make sure there is no adjacent level/additional fractures. Unlikely that this gentleman will require spinal surgery at this point in time. Patient be followed on a daily basis for his L1 compression fracture    Chepe Bowman DO      Ortho Trauma Attending:  Agree with above PA note.  Note edited where necessary.    Will need CRPP vs Donny pending medical optimization. Will continue to follow.     Abisai Medina MD  Orthopaedic Trauma Surgery

## 2019-03-07 ENCOUNTER — TRANSCRIPTION ENCOUNTER (OUTPATIENT)
Age: 74
End: 2019-03-07

## 2019-03-07 DIAGNOSIS — S72.001A FRACTURE OF UNSPECIFIED PART OF NECK OF RIGHT FEMUR, INITIAL ENCOUNTER FOR CLOSED FRACTURE: ICD-10-CM

## 2019-03-07 DIAGNOSIS — F10.231 ALCOHOL DEPENDENCE WITH WITHDRAWAL DELIRIUM: ICD-10-CM

## 2019-03-07 DIAGNOSIS — I48.91 UNSPECIFIED ATRIAL FIBRILLATION: ICD-10-CM

## 2019-03-07 LAB
ANION GAP SERPL CALC-SCNC: 14 MMOL/L — SIGNIFICANT CHANGE UP (ref 5–17)
APPEARANCE UR: CLEAR — SIGNIFICANT CHANGE UP
BACTERIA # UR AUTO: ABNORMAL
BILIRUB UR-MCNC: NEGATIVE — SIGNIFICANT CHANGE UP
BUN SERPL-MCNC: 16 MG/DL — SIGNIFICANT CHANGE UP (ref 8–20)
CALCIUM SERPL-MCNC: 8 MG/DL — LOW (ref 8.6–10.2)
CHLORIDE SERPL-SCNC: 100 MMOL/L — SIGNIFICANT CHANGE UP (ref 98–107)
CO2 SERPL-SCNC: 24 MMOL/L — SIGNIFICANT CHANGE UP (ref 22–29)
COLOR SPEC: YELLOW — SIGNIFICANT CHANGE UP
CREAT SERPL-MCNC: 0.4 MG/DL — LOW (ref 0.5–1.3)
DIFF PNL FLD: ABNORMAL
EOSINOPHIL # BLD AUTO: 0 K/UL — SIGNIFICANT CHANGE UP (ref 0–0.5)
EOSINOPHIL NFR BLD AUTO: 0.1 % — SIGNIFICANT CHANGE UP (ref 0–5)
EPI CELLS # UR: SIGNIFICANT CHANGE UP
GLUCOSE BLDC GLUCOMTR-MCNC: 106 MG/DL — HIGH (ref 70–99)
GLUCOSE BLDC GLUCOMTR-MCNC: 121 MG/DL — HIGH (ref 70–99)
GLUCOSE SERPL-MCNC: 109 MG/DL — SIGNIFICANT CHANGE UP (ref 70–115)
GLUCOSE UR QL: NEGATIVE MG/DL — SIGNIFICANT CHANGE UP
HBA1C BLD-MCNC: 4.8 % — SIGNIFICANT CHANGE UP (ref 4–5.6)
HCT VFR BLD CALC: 37.1 % — LOW (ref 42–52)
HCV AB S/CO SERPL IA: 0.17 S/CO — SIGNIFICANT CHANGE UP (ref 0–0.79)
HCV AB SERPL-IMP: SIGNIFICANT CHANGE UP
HGB BLD-MCNC: 12.6 G/DL — LOW (ref 14–18)
KETONES UR-MCNC: ABNORMAL
LEUKOCYTE ESTERASE UR-ACNC: ABNORMAL
LYMPHOCYTES # BLD AUTO: 1 K/UL — SIGNIFICANT CHANGE UP (ref 1–4.8)
LYMPHOCYTES # BLD AUTO: 8.2 % — LOW (ref 20–55)
MAGNESIUM SERPL-MCNC: 2 MG/DL — SIGNIFICANT CHANGE UP (ref 1.6–2.6)
MCHC RBC-ENTMCNC: 31.3 PG — HIGH (ref 27–31)
MCHC RBC-ENTMCNC: 34 G/DL — SIGNIFICANT CHANGE UP (ref 32–36)
MCV RBC AUTO: 92.1 FL — SIGNIFICANT CHANGE UP (ref 80–94)
MONOCYTES # BLD AUTO: 0.7 K/UL — SIGNIFICANT CHANGE UP (ref 0–0.8)
MONOCYTES NFR BLD AUTO: 5.8 % — SIGNIFICANT CHANGE UP (ref 3–10)
NEUTROPHILS # BLD AUTO: 10.6 K/UL — HIGH (ref 1.8–8)
NEUTROPHILS NFR BLD AUTO: 85.7 % — HIGH (ref 37–73)
NITRITE UR-MCNC: NEGATIVE — SIGNIFICANT CHANGE UP
PH UR: 7 — SIGNIFICANT CHANGE UP (ref 5–8)
PHOSPHATE SERPL-MCNC: 3.1 MG/DL — SIGNIFICANT CHANGE UP (ref 2.4–4.7)
PLATELET # BLD AUTO: 95 K/UL — LOW (ref 150–400)
POTASSIUM SERPL-MCNC: 3.8 MMOL/L — SIGNIFICANT CHANGE UP (ref 3.5–5.3)
POTASSIUM SERPL-SCNC: 3.8 MMOL/L — SIGNIFICANT CHANGE UP (ref 3.5–5.3)
PROT UR-MCNC: NEGATIVE MG/DL — SIGNIFICANT CHANGE UP
RBC # BLD: 4.03 M/UL — LOW (ref 4.6–6.2)
RBC # FLD: 16.4 % — HIGH (ref 11–15.6)
RBC CASTS # UR COMP ASSIST: ABNORMAL /HPF (ref 0–4)
SODIUM SERPL-SCNC: 138 MMOL/L — SIGNIFICANT CHANGE UP (ref 135–145)
SP GR SPEC: 1 — LOW (ref 1.01–1.02)
UROBILINOGEN FLD QL: NEGATIVE MG/DL — SIGNIFICANT CHANGE UP
WBC # BLD: 12.3 K/UL — HIGH (ref 4.8–10.8)
WBC # FLD AUTO: 12.3 K/UL — HIGH (ref 4.8–10.8)
WBC UR QL: ABNORMAL

## 2019-03-07 PROCEDURE — 99223 1ST HOSP IP/OBS HIGH 75: CPT

## 2019-03-07 PROCEDURE — 72100 X-RAY EXAM L-S SPINE 2/3 VWS: CPT | Mod: 26

## 2019-03-07 PROCEDURE — 99232 SBSQ HOSP IP/OBS MODERATE 35: CPT

## 2019-03-07 PROCEDURE — 72170 X-RAY EXAM OF PELVIS: CPT | Mod: 26,59

## 2019-03-07 PROCEDURE — 99221 1ST HOSP IP/OBS SF/LOW 40: CPT | Mod: 57

## 2019-03-07 PROCEDURE — 73552 X-RAY EXAM OF FEMUR 2/>: CPT | Mod: 26,RT

## 2019-03-07 PROCEDURE — 22310 CLOSED TX VERT FX W/O MANJ: CPT

## 2019-03-07 PROCEDURE — 93306 TTE W/DOPPLER COMPLETE: CPT | Mod: 26

## 2019-03-07 PROCEDURE — 73502 X-RAY EXAM HIP UNI 2-3 VIEWS: CPT | Mod: 26,RT

## 2019-03-07 RX ORDER — THIAMINE MONONITRATE (VIT B1) 100 MG
500 TABLET ORAL EVERY 8 HOURS
Qty: 0 | Refills: 0 | Status: COMPLETED | OUTPATIENT
Start: 2019-03-07 | End: 2019-03-10

## 2019-03-07 RX ORDER — POTASSIUM CHLORIDE 20 MEQ
10 PACKET (EA) ORAL ONCE
Qty: 0 | Refills: 0 | Status: COMPLETED | OUTPATIENT
Start: 2019-03-07 | End: 2019-03-07

## 2019-03-07 RX ORDER — MORPHINE SULFATE 50 MG/1
2 CAPSULE, EXTENDED RELEASE ORAL EVERY 4 HOURS
Qty: 0 | Refills: 0 | Status: DISCONTINUED | OUTPATIENT
Start: 2019-03-07 | End: 2019-03-11

## 2019-03-07 RX ADMIN — Medication 3 MILLILITER(S): at 19:41

## 2019-03-07 RX ADMIN — Medication 1 MILLIGRAM(S): at 16:39

## 2019-03-07 RX ADMIN — ENOXAPARIN SODIUM 30 MILLIGRAM(S): 100 INJECTION SUBCUTANEOUS at 06:23

## 2019-03-07 RX ADMIN — Medication 105 MILLIGRAM(S): at 13:25

## 2019-03-07 RX ADMIN — Medication 2 DROP(S): at 13:25

## 2019-03-07 RX ADMIN — HYDROMORPHONE HYDROCHLORIDE 0.5 MILLIGRAM(S): 2 INJECTION INTRAMUSCULAR; INTRAVENOUS; SUBCUTANEOUS at 00:25

## 2019-03-07 RX ADMIN — Medication 105 MILLIGRAM(S): at 21:55

## 2019-03-07 RX ADMIN — SODIUM CHLORIDE 120 MILLILITER(S): 9 INJECTION, SOLUTION INTRAVENOUS at 06:12

## 2019-03-07 RX ADMIN — Medication 3 MILLILITER(S): at 03:35

## 2019-03-07 RX ADMIN — PIPERACILLIN AND TAZOBACTAM 25 GRAM(S): 4; .5 INJECTION, POWDER, LYOPHILIZED, FOR SOLUTION INTRAVENOUS at 21:54

## 2019-03-07 RX ADMIN — Medication 100 MILLIEQUIVALENT(S): at 07:42

## 2019-03-07 RX ADMIN — Medication 1 PATCH: at 21:44

## 2019-03-07 RX ADMIN — PIPERACILLIN AND TAZOBACTAM 25 GRAM(S): 4; .5 INJECTION, POWDER, LYOPHILIZED, FOR SOLUTION INTRAVENOUS at 06:12

## 2019-03-07 RX ADMIN — PIPERACILLIN AND TAZOBACTAM 25 GRAM(S): 4; .5 INJECTION, POWDER, LYOPHILIZED, FOR SOLUTION INTRAVENOUS at 13:34

## 2019-03-07 RX ADMIN — Medication 50 MILLIGRAM(S): at 06:11

## 2019-03-07 RX ADMIN — CHLORHEXIDINE GLUCONATE 1 APPLICATION(S): 213 SOLUTION TOPICAL at 12:25

## 2019-03-07 RX ADMIN — Medication 3 MILLILITER(S): at 09:43

## 2019-03-07 RX ADMIN — Medication 2 DROP(S): at 10:22

## 2019-03-07 RX ADMIN — ENOXAPARIN SODIUM 30 MILLIGRAM(S): 100 INJECTION SUBCUTANEOUS at 18:27

## 2019-03-07 RX ADMIN — Medication 1 PATCH: at 12:23

## 2019-03-07 RX ADMIN — Medication 3 MILLILITER(S): at 14:56

## 2019-03-07 RX ADMIN — Medication 1 PATCH: at 07:38

## 2019-03-07 NOTE — CONSULT NOTE ADULT - SUBJECTIVE AND OBJECTIVE BOX
CARDIOLOGY CONSULTATION NOTE   Consult requested by:  Dr. Kerr    Reason for Consultation: New Onset AFib    History obtained by: Patient and medical record     obtained: No    Chief complaint:    Patient is a 73y old  Male who presents with a chief complaint of s/p found down and new onset AFib      HPI:  74yo male A&O to self and events with PMH Right eye blindness, depression, SI, ETOH abuse, current smoker (2ppd/90ppy) who was transferred 3/6 from [facility] [place] s/p fall and new onset AFib.  Patient was found on ground at home for unknown duration of time covered in feces. Patient states he remembers falling off couch and doesn't recall events after.  While at [facility] [place] he had PAF from 3/5 2245 to approx 3/6 0050, currently still in NSR and rate controlled on Amiodarone and cardizem drip.  Patient is poor historian and remains with garbled speech from intox and current mild sedation.  He denies following any outside doctor, though states he takes daily meds but cannot recall what they are.  Denies any SOB, chest pain, palpitations, fever, n/v/d.      REVIEW OF SYMPTOMS:   Constitutional: Denied: fever, chills, weight loss or gain  Eyes: Denied: reddened ayes, eye discharge, eye pain  ENMT: Denied: ear pain, nasal discharge, mouth pain, throat pain or swelling  Cardiovascular: Denied: chest pain,  Chest pressure, palpitation, arrhythmia, irregular or fast heart beats, edema  Respiratory: Denied: cough, phlegm production, wheezes, dyspnea, orthopnea, PND,   GI: Denied: Abdominal pain, nausea, vomiting, diarrhea, constipation, melena, rectal bleed  : Denied: hematuria, frequency  Musculoskeletal: Denied: Muscle aches, weakness, pain  Hematology/lymp: Denied: Bleeding disorder, anemia, blood clotting  Neuro: Denied: Headache, light headedness, dizziness, numbness, aphasia, dysarthria, seizure,  syncope, near syncope  Psych: Denied: depression, anxiety  Integumentary/skin: Denied: rash, bruises, ecchymosis, itching  Allergy/Immunology: denied environmental or drug allergies    ALL OTHER REVIEW OF SYSTEMS ARE NEGATIVE.    MEDICATIONS  (STANDING):  ALBUTerol/ipratropium for Nebulization 3 milliLiter(s) Nebulizer every 6 hours  amiodarone Infusion 0.5 mG/Min (16.667 mL/Hr) IV Continuous <Continuous>  artificial  tears Solution 2 Drop(s) Both EYES every 4 hours  chlordiazePOXIDE 50 milliGRAM(s) Oral every 6 hours  chlorhexidine 2% Cloths 1 Application(s) Topical daily  dexmedetomidine Infusion 0.3 MICROgram(s)/kG/Hr (4.972 mL/Hr) IV Continuous <Continuous>  dextrose 5%. 1000 milliLiter(s) (50 mL/Hr) IV Continuous <Continuous>  dextrose 50% Injectable 12.5 Gram(s) IV Push once  dextrose 50% Injectable 25 Gram(s) IV Push once  dextrose 50% Injectable 25 Gram(s) IV Push once  diltiazem    Tablet 30 milliGRAM(s) Oral every 6 hours  enoxaparin Injectable 30 milliGRAM(s) SubCutaneous every 12 hours  folic acid Injectable 1 milliGRAM(s) IV Push daily  insulin lispro (HumaLOG) corrective regimen sliding scale   SubCutaneous three times a day before meals  multiple electrolytes Injection Type 1 1000 milliLiter(s) (75 mL/Hr) IV Continuous <Continuous>  multivitamin 1 Tablet(s) Oral daily  nicotine -  14 mG/24Hr(s) Patch 1 patch Transdermal daily  piperacillin/tazobactam IVPB. 3.375 Gram(s) IV Intermittent every 8 hours  thiamine IVPB 500 milliGRAM(s) IV Intermittent every 8 hours    MEDICATIONS  (PRN):  dextrose 40% Gel 15 Gram(s) Oral once PRN Blood Glucose LESS THAN 70 milliGRAM(s)/deciliter  glucagon  Injectable 1 milliGRAM(s) IntraMuscular once PRN Glucose LESS THAN 70 milligrams/deciliter        PAST MEDICAL & SURGICAL HISTORY:  Depression  Alcohol abuse with intoxication  H/O eye surgery      FAMILY HISTORY:  No pertinent family history in first degree relatives      SOCIAL HISTORY:    CIGARETTES:  No    ALCOHOL: No    DRUGS: No    Vital Signs Last 24 Hrs  T(C): 37 (07 Mar 2019 11:00), Max: 37.1 (06 Mar 2019 21:00)  T(F): 98.6 (07 Mar 2019 11:00), Max: 98.7 (06 Mar 2019 21:00)  HR: 85 (07 Mar 2019 12:00) (84 - 99)  BP: 112/62 (07 Mar 2019 12:00) (95/57 - 158/70)  BP(mean): 81 (07 Mar 2019 12:00) (70 - 106)  RR: 18 (07 Mar 2019 12:00) (18 - 25)  SpO2: 96% (07 Mar 2019 12:00) (92% - 99%)    PHYSICAL EXAM:      Constitutional: No fever, chills, NAD, Comfortable    Eyes: Not reddened, no discharge    ENMT: No discharge, No pain    Neck: No JVD, No Bruit    Back: No CVA tenderness    Respiratory: Clear to auscultation bilaterally    Cardiovascular: RRR, Normal S1-2, No S3-, No friction rub murmur    Gastrointestinal: Soft, NT/ND. BS+, No organomegaly    Extremities: No edema    Vascular: Distal pulses intact    Neurological: Alert, awake, oriented, able to move extremities, speach is clear    Skin: No ecchymosis, no rash    Musculoskeletal: Non tender, no weaknss    Psychiatric: Aproppriate mood, no anxiety        INTERPRETATION OF TELEMETRY:    ECG:    I&O's Detail    06 Mar 2019 07:01  -  07 Mar 2019 07:00  --------------------------------------------------------  IN:    amiodarone Infusion: 66.6 mL    amiodarone Infusion: 200.4 mL    diltiazem Infusion: 30 mL    multiple electrolytes Injection Type 1: 1800 mL    Solution: 499.8 mL    Solution: 400 mL    Solution: 150 mL  Total IN: 3146.8 mL    OUT:    Indwelling Catheter - Urethral: 4125 mL  Total OUT: 4125 mL    Total NET: -978.2 mL      07 Mar 2019 07:01  -  07 Mar 2019 12:13  --------------------------------------------------------  IN:    amiodarone Infusion: 83.5 mL    dexmedetomidine Infusion: 16.5 mL    multiple electrolytes Injection Type 1: 510 mL    Solution: 50 mL  Total IN: 660 mL    OUT:    Indwelling Catheter - Urethral: 1325 mL  Total OUT: 1325 mL    Total NET: -665 mL          LABS:                        12.6   12.3  )-----------( 95       ( 07 Mar 2019 06:21 )             37.1     03-07    138  |  100  |  16.0  ----------------------------<  109  3.8   |  24.0  |  0.40<L>    Ca    8.0<L>      07 Mar 2019 05:05  Phos  3.1     03-07  Mg     2.0     03-07    TPro  5.5<L>  /  Alb  3.2<L>  /  TBili  1.4  /  DBili  0.4<H>  /  AST  42<H>  /  ALT  23  /  AlkPhos  62  03-06    CARDIAC MARKERS ( 06 Mar 2019 16:58 )  x     / <0.01 ng/mL / 691 U/L / x     / 10.1 ng/mL      PT/INR - ( 06 Mar 2019 16:58 )   PT: 10.2 sec;   INR: 0.89 ratio         PTT - ( 06 Mar 2019 16:58 )  PTT:25.3 sec    I&O's Summary    06 Mar 2019 07:01  -  07 Mar 2019 07:00  --------------------------------------------------------  IN: 3146.8 mL / OUT: 4125 mL / NET: -978.2 mL    07 Mar 2019 07:01  -  07 Mar 2019 12:13  --------------------------------------------------------  IN: 660 mL / OUT: 1325 mL / NET: -665 mL        RADIOLOGY & ADDITIONAL STUDIES:  X-ray:    PREVIOUS DIAGNOSTIC TESTING:      ECHO  FINDINGS: Date:                : LVEF=          ; RV function:       ; Valvular abnormalities: Mitral valve:           ;  Aortic valve:              ;Tricuspid valve:         ; Pulmonary pressures:        Pericardium:      STRESS    FINDINGS: Date:            ;      CATHETERIZATION  FINDINGS:  Date:              :  LAD:                       ;  LCx:                        ; RCA:                ; CARDIOLOGY CONSULTATION NOTE   Consult requested by:  Dr. Kerr    Reason for Consultation: New Onset AFib    History obtained by: Patient and medical record     obtained: No    Chief complaint:    Patient is a 73y old  Male who presents with a chief complaint of s/p found down and new onset AFib      HPI:  72yo male A&O to self and events with PMH Right eye blindness, depression, SI, ETOH abuse, current smoker (2ppd/90ppy) who was [place] 3[place]6 [place] [facility] [place] and new onset AFib.  Patient was found on ground at home for unknown duration of time covered in feces. Patient states he remembers falling off couch and doesn't recall events after.  [place] at [facility] [place] had PAF from 3/5 2245 to approx 3/6 0050, currently still in NSR and rate controlled on Amiodarone and cardizem drip.  Patient is poor historian and remains with garbled speech from intox and current mild sedation.  He denies following any outside doctor, though states he takes daily meds but cannot recall what they are.  Denies any SOB, chest pain, palpitations, fever, n/v/d.      REVIEW OF SYMPTOMS:   Constitutional: Denied: fever, chills, weight loss or gain  Eyes: Denied: reddened ayes, eye discharge, eye pain  ENMT: Denied: ear pain, nasal discharge, mouth pain, throat pain or swelling  Cardiovascular: AS PER HPI  Respiratory: Denied: cough, phlegm production, wheezes, dyspnea, orthopnea, PND,   GI: Denied: Abdominal pain, nausea, vomiting, diarrhea, constipation, melena, rectal bleed  : Denied: hematuria, frequency  Musculoskeletal: Denied: Muscle aches, weakness, pain  Hematology/lymp: Denied: Bleeding disorder, anemia, blood clotting  Neuro: Denied: Headache, light headedness, dizziness, numbness, aphasia, dysarthria, seizure,  syncope, near syncope  Psych: Denied: depression, anxiety  Integumentary/skin: Denied: rash, bruises, ecchymosis, itching  Allergy/Immunology: denied environmental or drug allergies    ALL OTHER REVIEW OF SYSTEMS ARE NEGATIVE.    MEDICATIONS  (STANDING):  ALBUTerol/ipratropium for Nebulization 3 milliLiter(s) Nebulizer every 6 hours  amiodarone Infusion 0.5 mG/Min (16.667 mL/Hr) IV Continuous <Continuous>  artificial  tears Solution 2 Drop(s) Both EYES every 4 hours  chlordiazePOXIDE 50 milliGRAM(s) Oral every 6 hours  chlorhexidine 2% Cloths 1 Application(s) Topical daily  dexmedetomidine Infusion 0.3 MICROgram(s)/kG/Hr (4.972 mL/Hr) IV Continuous <Continuous>  dextrose 5%. 1000 milliLiter(s) (50 mL/Hr) IV Continuous <Continuous>  dextrose 50% Injectable 12.5 Gram(s) IV Push once  dextrose 50% Injectable 25 Gram(s) IV Push once  dextrose 50% Injectable 25 Gram(s) IV Push once  diltiazem    Tablet 30 milliGRAM(s) Oral every 6 hours  enoxaparin Injectable 30 milliGRAM(s) SubCutaneous every 12 hours  folic acid Injectable 1 milliGRAM(s) IV Push daily  insulin lispro (HumaLOG) corrective regimen sliding scale   SubCutaneous three times a day before meals  multiple electrolytes Injection Type 1 1000 milliLiter(s) (75 mL/Hr) IV Continuous <Continuous>  multivitamin 1 Tablet(s) Oral daily  nicotine -  14 mG/24Hr(s) Patch 1 patch Transdermal daily  piperacillin/tazobactam IVPB. 3.375 Gram(s) IV Intermittent every 8 hours  thiamine IVPB 500 milliGRAM(s) IV Intermittent every 8 hours    MEDICATIONS  (PRN):  dextrose 40% Gel 15 Gram(s) Oral once PRN Blood Glucose LESS THAN 70 milliGRAM(s)/deciliter  glucagon  Injectable 1 milliGRAM(s) IntraMuscular once PRN Glucose LESS THAN 70 milligrams/deciliter        PAST MEDICAL & SURGICAL HISTORY:  Depression  Alcohol abuse with intoxication  H/O eye surgery      FAMILY HISTORY:  No pertinent family history in first degree relatives      SOCIAL HISTORY:    CIGARETTES:  No    ALCOHOL: No    DRUGS: No    Vital Signs Last 24 Hrs  T(C): 37 (07 Mar 2019 11:00), Max: 37.1 (06 Mar 2019 21:00)  T(F): 98.6 (07 Mar 2019 11:00), Max: 98.7 (06 Mar 2019 21:00)  HR: 85 (07 Mar 2019 12:00) (84 - 99)  BP: 112/62 (07 Mar 2019 12:00) (95/57 - 158/70)  BP(mean): 81 (07 Mar 2019 12:00) (70 - 106)  RR: 18 (07 Mar 2019 12:00) (18 - 25)  SpO2: 96% (07 Mar 2019 12:00) (92% - 99%)    PHYSICAL EXAM:      Constitutional: No fever, chills, NAD, Comfortable    Eyes: Not reddened, no discharge    ENMT: No discharge, No pain    Neck: No JVD, No Bruit    Back: No CVA tenderness    Respiratory: Clear to auscultation bilaterally    Cardiovascular: RRR, Normal S1-2, No S3-, No friction rub murmur    Gastrointestinal: Soft, NT/ND. BS+, No organomegaly    Extremities: No edema    Vascular: Distal pulses intact    Neurological: Alert, awake, oriented, able to move extremities, speach is clear    Skin: No ecchymosis, no rash    Musculoskeletal: Non tender, no weaknss    Psychiatric: Aproppriate mood, no anxiety        INTERPRETATION OF TELEMETRY:    ECG:    I&O's Detail    06 Mar 2019 07:01  -  07 Mar 2019 07:00  --------------------------------------------------------  IN:    amiodarone Infusion: 66.6 mL    amiodarone Infusion: 200.4 mL    diltiazem Infusion: 30 mL    multiple electrolytes Injection Type 1: 1800 mL    Solution: 499.8 mL    Solution: 400 mL    Solution: 150 mL  Total IN: 3146.8 mL    OUT:    Indwelling Catheter - Urethral: 4125 mL  Total OUT: 4125 mL    Total NET: -978.2 mL      07 Mar 2019 07:01  -  07 Mar 2019 12:13  --------------------------------------------------------  IN:    amiodarone Infusion: 83.5 mL    dexmedetomidine Infusion: 16.5 mL    multiple electrolytes Injection Type 1: 510 mL    Solution: 50 mL  Total IN: 660 mL    OUT:    Indwelling Catheter - Urethral: 1325 mL  Total OUT: 1325 mL    Total NET: -665 mL          LABS:                        12.6   12.3  )-----------( 95       ( 07 Mar 2019 06:21 )             37.1     03-07    138  |  100  |  16.0  ----------------------------<  109  3.8   |  24.0  |  0.40<L>    Ca    8.0<L>      07 Mar 2019 05:05  Phos  3.1     03-07  Mg     2.0     03-07    TPro  5.5<L>  /  Alb  3.2<L>  /  TBili  1.4  /  DBili  0.4<H>  /  AST  42<H>  /  ALT  23  /  AlkPhos  62  03-06    CARDIAC MARKERS ( 06 Mar 2019 16:58 )  x     / <0.01 ng/mL / 691 U/L / x     / 10.1 ng/mL      PT/INR - ( 06 Mar 2019 16:58 )   PT: 10.2 sec;   INR: 0.89 ratio         PTT - ( 06 Mar 2019 16:58 )  PTT:25.3 sec    I&O's Summary    06 Mar 2019 07:01  -  07 Mar 2019 07:00  --------------------------------------------------------  IN: 3146.8 mL / OUT: 4125 mL / NET: -978.2 mL    07 Mar 2019 07:01  -  07 Mar 2019 12:13  --------------------------------------------------------  IN: 660 mL / OUT: 1325 mL / NET: -665 mL        RADIOLOGY & ADDITIONAL STUDIES:  X-ray:    PREVIOUS DIAGNOSTIC TESTING:      ECHO  FINDINGS: Date:                : LVEF=          ; RV function:       ; Valvular abnormalities: Mitral valve:           ;  Aortic valve:              ;Tricuspid valve:         ; Pulmonary pressures:        Pericardium:      STRESS    FINDINGS: Date:            ;      CATHETERIZATION  FINDINGS:  Date:              :  LAD:                       ;  LCx:                        ; RCA:                ; CARDIOLOGY CONSULTATION NOTE   Consult requested by:  Dr. Kerr    Reason for Consultation: New Onset AFib    History obtained by: Patient and medical record     obtained: No    Chief complaint:    Patient is a 73y old  Male who presents with a chief complaint of s/p found down and new onset AFib      HPI:  72yo male A&O to self and events with PMH Right eye blindness, depression, SI, ETOH abuse, current smoker (2ppd/90ppy) (further Hx unknown due to not f/u with outpatient care) who was transferred 3/6 from [facility] [place] and new onset AFib.  Patient was found on ground at home for unknown duration of time covered in feces. Patient states he remembers falling off couch and doesn't recall events after.  While at [facility] [place] he had PAF from 3/5 2245 to approx 3/6 0050, currently still in NSR and rate controlled on Amiodarone and cardizem infusion.  Patient is poor historian and remains with garbled speech from intox and current mild sedation. Denies any SOB, chest pain, palpitations, fever, n/v/d.      REVIEW OF SYMPTOMS:   Constitutional: Denied: fever, chills, weight loss or gain  Eyes: Right eye blindness. Denied: reddened ayes, eye discharge, eye pain  ENMT: Denied: ear pain, nasal discharge, mouth pain, throat pain or swelling  Cardiovascular: AS PER HPI  Respiratory: Denied: cough, phlegm production, wheezes, dyspnea, orthopnea, PND,   GI: Denied: Abdominal pain, nausea, vomiting, diarrhea, constipation, melena, rectal bleed  : Denied: hematuria, frequency  Musculoskeletal: Denied: Muscle aches, weakness, pain  Hematology/lymp: Denied: Bleeding disorder, anemia, blood clotting  Neuro: Denied: Headache, light headedness, dizziness, numbness, aphasia, dysarthria, seizure,  syncope, near syncope  Psych: Depression. Denied: current SI    ALL OTHER REVIEW OF SYSTEMS ARE NEGATIVE.    MEDICATIONS  (STANDING):  ALBUTerol/ipratropium for Nebulization 3 milliLiter(s) Nebulizer every 6 hours  amiodarone Infusion 0.5 mG/Min (16.667 mL/Hr) IV Continuous <Continuous>  artificial  tears Solution 2 Drop(s) Both EYES every 4 hours  chlordiazePOXIDE 50 milliGRAM(s) Oral every 6 hours  chlorhexidine 2% Cloths 1 Application(s) Topical daily  dexmedetomidine Infusion 0.3 MICROgram(s)/kG/Hr (4.972 mL/Hr) IV Continuous <Continuous>  dextrose 5%. 1000 milliLiter(s) (50 mL/Hr) IV Continuous <Continuous>  dextrose 50% Injectable 12.5 Gram(s) IV Push once  dextrose 50% Injectable 25 Gram(s) IV Push once  dextrose 50% Injectable 25 Gram(s) IV Push once  diltiazem    Tablet 30 milliGRAM(s) Oral every 6 hours  enoxaparin Injectable 30 milliGRAM(s) SubCutaneous every 12 hours  folic acid Injectable 1 milliGRAM(s) IV Push daily  insulin lispro (HumaLOG) corrective regimen sliding scale   SubCutaneous three times a day before meals  multiple electrolytes Injection Type 1 1000 milliLiter(s) (75 mL/Hr) IV Continuous <Continuous>  multivitamin 1 Tablet(s) Oral daily  nicotine -  14 mG/24Hr(s) Patch 1 patch Transdermal daily  piperacillin/tazobactam IVPB. 3.375 Gram(s) IV Intermittent every 8 hours  thiamine IVPB 500 milliGRAM(s) IV Intermittent every 8 hours    MEDICATIONS  (PRN):  dextrose 40% Gel 15 Gram(s) Oral once PRN Blood Glucose LESS THAN 70 milliGRAM(s)/deciliter  glucagon  Injectable 1 milliGRAM(s) IntraMuscular once PRN Glucose LESS THAN 70 milligrams/deciliter        PAST MEDICAL & SURGICAL HISTORY:  Right eye blindness  Depression  SI  Alcohol abuse with intoxication  H/O eye surgery      FAMILY HISTORY:  No pertinent family history in first degree relatives      SOCIAL HISTORY: Lives alone    CIGARETTES: current smoker (2ppd/90ppy)    ALCOHOL: Daily    DRUGS: Denies    Vital Signs Last 24 Hrs  T(C): 37 (07 Mar 2019 11:00), Max: 37.1 (06 Mar 2019 21:00)  T(F): 98.6 (07 Mar 2019 11:00), Max: 98.7 (06 Mar 2019 21:00)  HR: 85 (07 Mar 2019 12:00) (84 - 99)  BP: 112/62 (07 Mar 2019 12:00) (95/57 - 158/70)  BP(mean): 81 (07 Mar 2019 12:00) (70 - 106)  RR: 18 (07 Mar 2019 12:00) (18 - 25)  SpO2: 96% (07 Mar 2019 12:00) (92% - 99%)    PHYSICAL EXAM:    Constitutional: No fever, chills, NAD, Comfortable    Eyes: Not reddened, no discharge, unable to open right eye (baseline)    ENMT: No discharge, No pain    Neck: No JVD, No Bruit    Back: No CVA tenderness    Respiratory: B/L crackles, respirations even and unlabored    Cardiovascular: RRR, Normal S1-2, No S3-, No friction rub murmur    Gastrointestinal: Soft, NT/ND. BS+, No organomegaly    Extremities: No edema    Vascular: Distal pulses intact    Neurological: Alert to voice, somnolent, oriented to place and events, able to move extremities, speach is garbled    Skin: No ecchymosis, no rash    Musculoskeletal: Non tender, no weaknss    Psychiatric: Aproppriate mood, no anxiety        INTERPRETATION OF TELEMETRY: NSR    ECG: NSR, NSST abnormalities, LAFB    I&O's Detail    06 Mar 2019 07:01  -  07 Mar 2019 07:00  --------------------------------------------------------  IN:    amiodarone Infusion: 66.6 mL    amiodarone Infusion: 200.4 mL    diltiazem Infusion: 30 mL    multiple electrolytes Injection Type 1: 1800 mL    Solution: 499.8 mL    Solution: 400 mL    Solution: 150 mL  Total IN: 3146.8 mL    OUT:    Indwelling Catheter - Urethral: 4125 mL  Total OUT: 4125 mL    Total NET: -978.2 mL      07 Mar 2019 07:01  -  07 Mar 2019 12:13  --------------------------------------------------------  IN:    amiodarone Infusion: 83.5 mL    dexmedetomidine Infusion: 16.5 mL    multiple electrolytes Injection Type 1: 510 mL    Solution: 50 mL  Total IN: 660 mL    OUT:    Indwelling Catheter - Urethral: 1325 mL  Total OUT: 1325 mL    Total NET: -665 mL          LABS:                        12.6   12.3  )-----------( 95       ( 07 Mar 2019 06:21 )             37.1     03-07    138  |  100  |  16.0  ----------------------------<  109  3.8   |  24.0  |  0.40<L>    Ca    8.0<L>      07 Mar 2019 05:05  Phos  3.1     03-07  Mg     2.0     03-07    TPro  5.5<L>  /  Alb  3.2<L>  /  TBili  1.4  /  DBili  0.4<H>  /  AST  42<H>  /  ALT  23  /  AlkPhos  62  03-06    CARDIAC MARKERS ( 06 Mar 2019 16:58 )  x     / <0.01 ng/mL / 691 U/L / x     / 10.1 ng/mL      PT/INR - ( 06 Mar 2019 16:58 )   PT: 10.2 sec;   INR: 0.89 ratio         PTT - ( 06 Mar 2019 16:58 )  PTT:25.3 sec    I&O's Summary    06 Mar 2019 07:01  -  07 Mar 2019 07:00  --------------------------------------------------------  IN: 3146.8 mL / OUT: 4125 mL / NET: -978.2 mL    07 Mar 2019 07:01  -  07 Mar 2019 12:13  --------------------------------------------------------  IN: 660 mL / OUT: 1325 mL / NET: -665 mL        < from: Xray Chest 1 View-PORTABLE IMMEDIATE (03.06.19 @ 19:40) >   EXAM:  XR CHEST PORTABLE IMMED 1V                         EXAM:  XR CHEST PORTABLE IMMED 1V                          PROCEDURE DATE:  03/06/2019          INTERPRETATION:  XR CHEST PORTABLE IMMED 1V, XR CHEST PORTABLE IMMED 1V    Single AP view    HISTORY:  s/p trauma    Comparison:  None.    5:05 PM  There is patchy airspace disease at the right lung base.    Heart normal in size.    Degenerative changes in the spine. Partially imaged fusion hardware in   the proximal right humerus.    7:23 PM  Patchy airspace disease again seen at the right lung base.    IMPRESSION:     Patchy airspace disease at the right lung base; clinical correlation is   recommended for pneumonia; if the patient is asymptomatic, a noncontrast   chest CT could be obtained for further evaluation, otherwise a follow-up   chest x-ray is recommended in one month following treatment to ensure   resolution..        < end of copied text > CARDIOLOGY CONSULTATION NOTE   Consult requested by:  Dr. Kerr    Reason for Consultation: New Onset AFib    History obtained by: Patient and medical record     obtained: No    Chief complaint:    Patient is a 73y old  Male who presents with a chief complaint of s/p found down and new onset AFib      HPI:  74yo male A&O to self and events with PMH Right eye blindness, depression, SI, ETOH abuse, current smoker (2ppd/90ppy), further Hx unknown, who doesn't f/u with outpatient care, [place] [facility] [place] new [place]ib[place]  Patient was found on ground at home for unknown duration of time covered in feces. Patient states he remembers falling off couch and doesn't recall events after.  [place] [facility] [place] 3/5 2245 to approx 3/6 0050, currently still in NSR and rate controlled on Amiodarone and cardizem infusion.  Patient is poor historian and remains with garbled speech from intox and current mild sedation. Denies any SOB, chest pain, palpitations, fever, n/v/d.      REVIEW OF SYMPTOMS:   Constitutional: Denied: fever, chills, weight loss or gain  Eyes: Right eye blindness. Denied: reddened ayes, eye discharge, eye pain  ENMT: Denied: ear pain, nasal discharge, mouth pain, throat pain or swelling  Cardiovascular: AS PER HPI  Respiratory: Denied: cough, phlegm production, wheezes, dyspnea, orthopnea, PND,   GI: Denied: Abdominal pain, nausea, vomiting, diarrhea, constipation, melena, rectal bleed  : Denied: hematuria, frequency  Musculoskeletal: Denied: Muscle aches, weakness, pain  Hematology/lymp: Denied: Bleeding disorder, anemia, blood clotting  Neuro: Denied: Headache, light headedness, dizziness, numbness, aphasia, dysarthria, seizure,  syncope, near syncope  Psych: Depression. Denied: current SI    ALL OTHER REVIEW OF SYSTEMS ARE NEGATIVE.    MEDICATIONS  (STANDING):  ALBUTerol/ipratropium for Nebulization 3 milliLiter(s) Nebulizer every 6 hours  amiodarone Infusion 0.5 mG/Min (16.667 mL/Hr) IV Continuous <Continuous>  artificial  tears Solution 2 Drop(s) Both EYES every 4 hours  chlordiazePOXIDE 50 milliGRAM(s) Oral every 6 hours  chlorhexidine 2% Cloths 1 Application(s) Topical daily  dexmedetomidine Infusion 0.3 MICROgram(s)/kG/Hr (4.972 mL/Hr) IV Continuous <Continuous>  dextrose 5%. 1000 milliLiter(s) (50 mL/Hr) IV Continuous <Continuous>  dextrose 50% Injectable 12.5 Gram(s) IV Push once  dextrose 50% Injectable 25 Gram(s) IV Push once  dextrose 50% Injectable 25 Gram(s) IV Push once  diltiazem    Tablet 30 milliGRAM(s) Oral every 6 hours  enoxaparin Injectable 30 milliGRAM(s) SubCutaneous every 12 hours  folic acid Injectable 1 milliGRAM(s) IV Push daily  insulin lispro (HumaLOG) corrective regimen sliding scale   SubCutaneous three times a day before meals  multiple electrolytes Injection Type 1 1000 milliLiter(s) (75 mL/Hr) IV Continuous <Continuous>  multivitamin 1 Tablet(s) Oral daily  nicotine -  14 mG/24Hr(s) Patch 1 patch Transdermal daily  piperacillin/tazobactam IVPB. 3.375 Gram(s) IV Intermittent every 8 hours  thiamine IVPB 500 milliGRAM(s) IV Intermittent every 8 hours    MEDICATIONS  (PRN):  dextrose 40% Gel 15 Gram(s) Oral once PRN Blood Glucose LESS THAN 70 milliGRAM(s)/deciliter  glucagon  Injectable 1 milliGRAM(s) IntraMuscular once PRN Glucose LESS THAN 70 milligrams/deciliter        PAST MEDICAL & SURGICAL HISTORY:  Right eye blindness  Depression  SI  Alcohol abuse with intoxication  H/O eye surgery      FAMILY HISTORY:  No pertinent family history in first degree relatives      SOCIAL HISTORY: Lives alone    CIGARETTES: current smoker (2ppd/90ppy)    ALCOHOL: Daily    DRUGS: Denies    Vital Signs Last 24 Hrs  T(C): 37 (07 Mar 2019 11:00), Max: 37.1 (06 Mar 2019 21:00)  T(F): 98.6 (07 Mar 2019 11:00), Max: 98.7 (06 Mar 2019 21:00)  HR: 85 (07 Mar 2019 12:00) (84 - 99)  BP: 112/62 (07 Mar 2019 12:00) (95/57 - 158/70)  BP(mean): 81 (07 Mar 2019 12:00) (70 - 106)  RR: 18 (07 Mar 2019 12:00) (18 - 25)  SpO2: 96% (07 Mar 2019 12:00) (92% - 99%)    PHYSICAL EXAM:    Constitutional: No fever, chills, NAD, Comfortable    Eyes: Not reddened, no discharge, unable to open right eye (baseline)    ENMT: No discharge, No pain    Neck: No JVD, No Bruit    Back: No CVA tenderness    Respiratory: B/L crackles, respirations even and unlabored    Cardiovascular: RRR, Normal S1-2, No S3-, No friction rub murmur    Gastrointestinal: Soft, NT/ND. BS+, No organomegaly    Extremities: No edema    Vascular: Distal pulses intact    Neurological: Alert to voice, somnolent, oriented to place and events, able to move extremities, speach is garbled    Skin: No ecchymosis, no rash    Musculoskeletal: Non tender, no weaknss    Psychiatric: Aproppriate mood, no anxiety        INTERPRETATION OF TELEMETRY: NSR    ECG: NSR, NSST abnormalities, LAFB    I&O's Detail    06 Mar 2019 07:01  -  07 Mar 2019 07:00  --------------------------------------------------------  IN:    amiodarone Infusion: 66.6 mL    amiodarone Infusion: 200.4 mL    diltiazem Infusion: 30 mL    multiple electrolytes Injection Type 1: 1800 mL    Solution: 499.8 mL    Solution: 400 mL    Solution: 150 mL  Total IN: 3146.8 mL    OUT:    Indwelling Catheter - Urethral: 4125 mL  Total OUT: 4125 mL    Total NET: -978.2 mL      07 Mar 2019 07:01  -  07 Mar 2019 12:13  --------------------------------------------------------  IN:    amiodarone Infusion: 83.5 mL    dexmedetomidine Infusion: 16.5 mL    multiple electrolytes Injection Type 1: 510 mL    Solution: 50 mL  Total IN: 660 mL    OUT:    Indwelling Catheter - Urethral: 1325 mL  Total OUT: 1325 mL    Total NET: -665 mL          LABS:                        12.6   12.3  )-----------( 95       ( 07 Mar 2019 06:21 )             37.1     03-07    138  |  100  |  16.0  ----------------------------<  109  3.8   |  24.0  |  0.40<L>    Ca    8.0<L>      07 Mar 2019 05:05  Phos  3.1     03-07  Mg     2.0     03-07    TPro  5.5<L>  /  Alb  3.2<L>  /  TBili  1.4  /  DBili  0.4<H>  /  AST  42<H>  /  ALT  23  /  AlkPhos  62  03-06    CARDIAC MARKERS ( 06 Mar 2019 16:58 )  x     / <0.01 ng/mL / 691 U/L / x     / 10.1 ng/mL      PT/INR - ( 06 Mar 2019 16:58 )   PT: 10.2 sec;   INR: 0.89 ratio         PTT - ( 06 Mar 2019 16:58 )  PTT:25.3 sec    I&O's Summary    06 Mar 2019 07:01  -  07 Mar 2019 07:00  --------------------------------------------------------  IN: 3146.8 mL / OUT: 4125 mL / NET: -978.2 mL    07 Mar 2019 07:01  -  07 Mar 2019 12:13  --------------------------------------------------------  IN: 660 mL / OUT: 1325 mL / NET: -665 mL        < from: Xray Chest 1 View-PORTABLE IMMEDIATE (03.06.19 @ 19:40) >   EXAM:  XR CHEST PORTABLE IMMED 1V                         EXAM:  XR CHEST PORTABLE IMMED 1V                          PROCEDURE DATE:  03/06/2019          INTERPRETATION:  XR CHEST PORTABLE IMMED 1V, XR CHEST PORTABLE IMMED 1V    Single AP view    HISTORY:  s/p trauma    Comparison:  None.    5:05 PM  There is patchy airspace disease at the right lung base.    Heart normal in size.    Degenerative changes in the spine. Partially imaged fusion hardware in   the proximal right humerus.    7:23 PM  Patchy airspace disease again seen at the right lung base.    IMPRESSION:     Patchy airspace disease at the right lung base; clinical correlation is   recommended for pneumonia; if the patient is asymptomatic, a noncontrast   chest CT could be obtained for further evaluation, otherwise a follow-up   chest x-ray is recommended in one month following treatment to ensure   resolution..        < end of copied text > CARDIOLOGY CONSULTATION NOTE   Consult requested by:  Dr. Kerr    Reason for Consultation: New Onset AFib    History obtained by: Patient and medical record     obtained: No    Chief complaint:    Patient is a 73y old  Male who presents with a chief complaint of s/p found down and new onset AFib      HPI:  74yo male A&O to self and events with PMH Right eye blindness, depression, SI, ETOH abuse, current smoker (2ppd/90ppy), further Hx unknown, who doesn't f/u with outpatient care, was transferred 3/6 from [facility] s/p fall and new onset AFib.  Patient was found on ground at home for unknown duration of time covered in feces. Patient states he remembers falling off couch and doesn't recall events after.  While at [facility] he had PAF from 3/5 2245 to approx 3/6 0050, currently still in NSR and rate controlled on Amiodarone and cardizem infusion.  Patient is poor historian and remains with garbled speech from intox and current mild sedation. Denies any SOB, chest pain, palpitations, fever, n/v/d.      REVIEW OF SYMPTOMS:   Constitutional: Denied: fever, chills, weight loss or gain  Eyes: Right eye blindness. Denied: reddened ayes, eye discharge, eye pain  ENMT: Denied: ear pain, nasal discharge, mouth pain, throat pain or swelling  Cardiovascular: AS PER HPI  Respiratory: Denied: cough, phlegm production, wheezes, dyspnea, orthopnea, PND,   GI: Denied: Abdominal pain, nausea, vomiting, diarrhea, constipation, melena, rectal bleed  : Denied: hematuria, frequency  Musculoskeletal: Denied: Muscle aches, weakness, pain  Hematology/lymp: Denied: Bleeding disorder, anemia, blood clotting  Neuro: Denied: Headache, light headedness, dizziness, numbness, aphasia, dysarthria, seizure,  syncope, near syncope  Psych: Depression. Denied: current SI    ALL OTHER REVIEW OF SYSTEMS ARE NEGATIVE.    MEDICATIONS  (STANDING):  ALBUTerol/ipratropium for Nebulization 3 milliLiter(s) Nebulizer every 6 hours  amiodarone Infusion 0.5 mG/Min (16.667 mL/Hr) IV Continuous <Continuous>  artificial  tears Solution 2 Drop(s) Both EYES every 4 hours  chlordiazePOXIDE 50 milliGRAM(s) Oral every 6 hours  chlorhexidine 2% Cloths 1 Application(s) Topical daily  dexmedetomidine Infusion 0.3 MICROgram(s)/kG/Hr (4.972 mL/Hr) IV Continuous <Continuous>  dextrose 5%. 1000 milliLiter(s) (50 mL/Hr) IV Continuous <Continuous>  dextrose 50% Injectable 12.5 Gram(s) IV Push once  dextrose 50% Injectable 25 Gram(s) IV Push once  dextrose 50% Injectable 25 Gram(s) IV Push once  diltiazem    Tablet 30 milliGRAM(s) Oral every 6 hours  enoxaparin Injectable 30 milliGRAM(s) SubCutaneous every 12 hours  folic acid Injectable 1 milliGRAM(s) IV Push daily  insulin lispro (HumaLOG) corrective regimen sliding scale   SubCutaneous three times a day before meals  multiple electrolytes Injection Type 1 1000 milliLiter(s) (75 mL/Hr) IV Continuous <Continuous>  multivitamin 1 Tablet(s) Oral daily  nicotine -  14 mG/24Hr(s) Patch 1 patch Transdermal daily  piperacillin/tazobactam IVPB. 3.375 Gram(s) IV Intermittent every 8 hours  thiamine IVPB 500 milliGRAM(s) IV Intermittent every 8 hours    MEDICATIONS  (PRN):  dextrose 40% Gel 15 Gram(s) Oral once PRN Blood Glucose LESS THAN 70 milliGRAM(s)/deciliter  glucagon  Injectable 1 milliGRAM(s) IntraMuscular once PRN Glucose LESS THAN 70 milligrams/deciliter        PAST MEDICAL & SURGICAL HISTORY:  Right eye blindness  Depression  SI  Alcohol abuse with intoxication  H/O eye surgery      FAMILY HISTORY:  No pertinent family history in first degree relatives      SOCIAL HISTORY: Lives alone    CIGARETTES: current smoker (2ppd/90ppy)    ALCOHOL: Daily    DRUGS: Denies    Vital Signs Last 24 Hrs  T(C): 37 (07 Mar 2019 11:00), Max: 37.1 (06 Mar 2019 21:00)  T(F): 98.6 (07 Mar 2019 11:00), Max: 98.7 (06 Mar 2019 21:00)  HR: 85 (07 Mar 2019 12:00) (84 - 99)  BP: 112/62 (07 Mar 2019 12:00) (95/57 - 158/70)  BP(mean): 81 (07 Mar 2019 12:00) (70 - 106)  RR: 18 (07 Mar 2019 12:00) (18 - 25)  SpO2: 96% (07 Mar 2019 12:00) (92% - 99%)    PHYSICAL EXAM:    Constitutional: No fever, chills, NAD, Comfortable    Eyes: Not reddened, no discharge, unable to open right eye (baseline)    ENMT: No discharge, No pain    Neck: No JVD, No Bruit    Back: No CVA tenderness    Respiratory: B/L crackles, respirations even and unlabored    Cardiovascular: RRR, Normal S1-2, No S3-, No friction rub murmur    Gastrointestinal: Soft, NT/ND. BS+, No organomegaly    Extremities: No edema    Vascular: Distal pulses intact    Neurological: Alert to voice, somnolent, oriented to place and events, able to move extremities, speach is garbled    Skin: No ecchymosis, no rash    Musculoskeletal: Non tender, no weaknss    Psychiatric: Aproppriate mood, no anxiety        INTERPRETATION OF TELEMETRY: NSR    ECG: NSR, NSST abnormalities, LAFB    I&O's Detail    06 Mar 2019 07:01  -  07 Mar 2019 07:00  --------------------------------------------------------  IN:    amiodarone Infusion: 66.6 mL    amiodarone Infusion: 200.4 mL    diltiazem Infusion: 30 mL    multiple electrolytes Injection Type 1: 1800 mL    Solution: 499.8 mL    Solution: 400 mL    Solution: 150 mL  Total IN: 3146.8 mL    OUT:    Indwelling Catheter - Urethral: 4125 mL  Total OUT: 4125 mL    Total NET: -978.2 mL      07 Mar 2019 07:01  -  07 Mar 2019 12:13  --------------------------------------------------------  IN:    amiodarone Infusion: 83.5 mL    dexmedetomidine Infusion: 16.5 mL    multiple electrolytes Injection Type 1: 510 mL    Solution: 50 mL  Total IN: 660 mL    OUT:    Indwelling Catheter - Urethral: 1325 mL  Total OUT: 1325 mL    Total NET: -665 mL          LABS:                        12.6   12.3  )-----------( 95       ( 07 Mar 2019 06:21 )             37.1     03-07    138  |  100  |  16.0  ----------------------------<  109  3.8   |  24.0  |  0.40<L>    Ca    8.0<L>      07 Mar 2019 05:05  Phos  3.1     03-07  Mg     2.0     03-07    TPro  5.5<L>  /  Alb  3.2<L>  /  TBili  1.4  /  DBili  0.4<H>  /  AST  42<H>  /  ALT  23  /  AlkPhos  62  03-06    CARDIAC MARKERS ( 06 Mar 2019 16:58 )  x     / <0.01 ng/mL / 691 U/L / x     / 10.1 ng/mL      PT/INR - ( 06 Mar 2019 16:58 )   PT: 10.2 sec;   INR: 0.89 ratio         PTT - ( 06 Mar 2019 16:58 )  PTT:25.3 sec    I&O's Summary    06 Mar 2019 07:01  -  07 Mar 2019 07:00  --------------------------------------------------------  IN: 3146.8 mL / OUT: 4125 mL / NET: -978.2 mL    07 Mar 2019 07:01  -  07 Mar 2019 12:13  --------------------------------------------------------  IN: 660 mL / OUT: 1325 mL / NET: -665 mL    < from: TTE Echo Complete w/Doppler (03.07.19 @ 15:03) >  PHYSICIAN INTERPRETATION:  Left Ventricle: The left ventricular internal cavity size is normal.  Global LV systolic function was normal. Left ventricular ejection   fraction, by visual estimation, is 55 to 60%.  Right Ventricle: The right ventricular size isnormal. RV systolic   function is normal.  Left Atrium: The left atrium was not well visualized.  Right Atrium: Normal right atrial size.  Pericardium: There is no evidence of pericardial effusion.  Mitral Valve: There is mild mitral annular calcification.  Tricuspid Valve: The tricuspid valve is normal. No tricuspid   regurgitation is visualized.  Aortic Valve: The aortic valve is normal. Sclerotic aortic valve with   normal opening. No evidence of aortic valve regurgitation is seen.  Pulmonary Artery: The pulmonary artery is of normal size and origin.       Summary:   1. Left ventricular ejection fraction, by visual estimation, is 55 to   60%.   2. Normal global left ventricular systolic function.   3. There is no evidence of pericardial effusion.   4. Mild mitral annular calcification.   5. Sclerotic aortic valve with normal opening.   6. Limited acoustic windows. Subcostal views are best.    [id number] Raf Licea MD, Electronically signed on 3/7/2019 at 5:01:32 PM       < end of copied text >      < from: Xray Chest 1 View-PORTABLE IMMEDIATE (03.06.19 @ 19:40) >   EXAM:  XR CHEST PORTABLE IMMED 1V                         EXAM:  XR CHEST PORTABLE IMMED 1V                          PROCEDURE DATE:  03/06/2019          INTERPRETATION:  XR CHEST PORTABLE IMMED 1V, XR CHEST PORTABLE IMMED 1V    Single AP view    HISTORY:  s/p trauma    Comparison:  None.    5:05 PM  There is patchy airspace disease at the right lung base.    Heart normal in size.    Degenerative changes in the spine. Partially imaged fusion hardware in   the proximal right humerus.    7:23 PM  Patchy airspace disease again seen at the right lung base.    IMPRESSION:     Patchy airspace disease at the right lung base; clinical correlation is   recommended for pneumonia; if the patient is asymptomatic, a noncontrast   chest CT could be obtained for further evaluation, otherwise a follow-up   chest x-ray is recommended in one month following treatment to ensure   resolution..        < end of copied text >

## 2019-03-07 NOTE — SWALLOW BEDSIDE ASSESSMENT ADULT - SWALLOW EVAL: DIAGNOSIS
Mild oral dysphagia, impaired manipulation of bolus due to inattention of bolus. Pharyngeal stage:  a significant delayed  swallow reflex pt required max cues to initiate swallow. Pt with poor attention to bolus accompanied with verbalizations during po trials

## 2019-03-07 NOTE — SWALLOW BEDSIDE ASSESSMENT ADULT - SLP PERTINENT HISTORY OF CURRENT PROBLEM
As per h&p: 73 year old male s/p found down for unknown duration found to be septic likely from pyelonephritis, new onset atrial fibrillation with RVR and traumatic injuries of lumbar spine fracture and right femoral neck fracture

## 2019-03-07 NOTE — SWALLOW BEDSIDE ASSESSMENT ADULT - PHARYNGEAL PHASE
Hyponatremia Delayed pharyngeal swallow/+8 second delay, attention to bolus,+ verbalizations during po trials

## 2019-03-07 NOTE — PROGRESS NOTE ADULT - SUBJECTIVE AND OBJECTIVE BOX
INTERVAL HPI/OVERNIGHT EVENTS/SUBJECTIVE: 74 yo male found down with ETOH dependence, pyelonephritis, afib, right femoral neck fracture and L1 compression fracture.  Urology consulted patient for stone and do not feel it is obstructing and do not rec any specific additional urologic intervention. Patient SANDRA-fib converted to NSR on Amio. Had coughed on thin liquids. Patient on librium and precedex for alcohol withdrawal.     ICU Vital Signs Last 24 Hrs  T(C): 37 (07 Mar 2019 11:00), Max: 37.1 (06 Mar 2019 21:00)  T(F): 98.6 (07 Mar 2019 11:00), Max: 98.7 (06 Mar 2019 21:00)  HR: 85 (07 Mar 2019 12:00) (84 - 99)  BP: 112/62 (07 Mar 2019 12:00) (95/57 - 158/70)  BP(mean): 81 (07 Mar 2019 12:00) (70 - 106)  ABP: --  ABP(mean): --  RR: 18 (07 Mar 2019 12:00) (18 - 25)  SpO2: 96% (07 Mar 2019 12:00) (92% - 99%)      I&O's Detail    06 Mar 2019 07:01  -  07 Mar 2019 07:00  --------------------------------------------------------  IN:    amiodarone Infusion: 66.6 mL    amiodarone Infusion: 200.4 mL    diltiazem Infusion: 30 mL    multiple electrolytes Injection Type 1: 1800 mL    Solution: 400 mL    Solution: 150 mL    Solution: 499.8 mL  Total IN: 3146.8 mL    OUT:    Indwelling Catheter - Urethral: 4125 mL  Total OUT: 4125 mL    Total NET: -978.2 mL      07 Mar 2019 07:01  -  07 Mar 2019 13:31  --------------------------------------------------------  IN:    amiodarone Infusion: 100.2 mL    dexmedetomidine Infusion: 20.6 mL    multiple electrolytes Injection Type 1: 585 mL    Solution: 50 mL  Total IN: 755.8 mL    OUT:    Indwelling Catheter - Urethral: 1475 mL  Total OUT: 1475 mL    Total NET: -719.2 mL            ABG - ( 06 Mar 2019 16:51 )  pH, Arterial: 7.50  pH, Blood: x     /  pCO2: 35    /  pO2: 54    / HCO3: 28    / Base Excess: 4.0   /  SaO2: 89                  MEDICATIONS  (STANDING):  ALBUTerol/ipratropium for Nebulization 3 milliLiter(s) Nebulizer every 6 hours  amiodarone Infusion 0.5 mG/Min (16.667 mL/Hr) IV Continuous <Continuous>  artificial  tears Solution 2 Drop(s) Both EYES every 4 hours  chlordiazePOXIDE 50 milliGRAM(s) Oral every 6 hours  chlorhexidine 2% Cloths 1 Application(s) Topical daily  dexmedetomidine Infusion 0.3 MICROgram(s)/kG/Hr (4.972 mL/Hr) IV Continuous <Continuous>  dextrose 5%. 1000 milliLiter(s) (50 mL/Hr) IV Continuous <Continuous>  dextrose 50% Injectable 12.5 Gram(s) IV Push once  dextrose 50% Injectable 25 Gram(s) IV Push once  dextrose 50% Injectable 25 Gram(s) IV Push once  diltiazem    Tablet 30 milliGRAM(s) Oral every 6 hours  enoxaparin Injectable 30 milliGRAM(s) SubCutaneous every 12 hours  folic acid Injectable 1 milliGRAM(s) IV Push daily  insulin lispro (HumaLOG) corrective regimen sliding scale   SubCutaneous three times a day before meals  multiple electrolytes Injection Type 1 1000 milliLiter(s) (75 mL/Hr) IV Continuous <Continuous>  multivitamin 1 Tablet(s) Oral daily  nicotine -  14 mG/24Hr(s) Patch 1 patch Transdermal daily  piperacillin/tazobactam IVPB. 3.375 Gram(s) IV Intermittent every 8 hours  thiamine IVPB 500 milliGRAM(s) IV Intermittent every 8 hours    MEDICATIONS  (PRN):  dextrose 40% Gel 15 Gram(s) Oral once PRN Blood Glucose LESS THAN 70 milliGRAM(s)/deciliter  glucagon  Injectable 1 milliGRAM(s) IntraMuscular once PRN Glucose LESS THAN 70 milligrams/deciliter      NUTRITION/IVF: NPO/PIV    VELAZQUEZ:  03/06 Total NET: -719.2 mL, no hematuria     MISC:     PHYSICAL EXAM:     Gen:NAD, ill appearing, No cyanosis, Pallor.    Eyes: PERRL, minimal yellow discharge on b/l eyelids    Neurological: A&Ox2, GCS 13, sensory intact    Neck: Supple. NT AT, FROM no pain.  No JVD. No meningeal signs    Pulmonary: NAD, CTA, = BL     Cardiovascular: RRR, S1, S2, No Murmurs, rubs or gallops noted. no afib noted    Gastrointestinal: ND, Soft, NT    Genitourinary: velazquez placed     Extremities:  AT, no edema, erythema or palpable cord noted. b/l 1 + pedal and radial pulses palpated.    Skin: Intact    Musculoskeletal: Rt femor moderate tenderness to palpation.  Pain limits FROM.  All else NT, AT, FROM.           LABS:  CBC Full  -  ( 07 Mar 2019 06:21 )  WBC Count : 12.3 K/uL  Hemoglobin : 12.6 g/dL  Hematocrit : 37.1 %  Platelet Count - Automated : 95 K/uL  Mean Cell Volume : 92.1 fl  Mean Cell Hemoglobin : 31.3 pg  Mean Cell Hemoglobin Concentration : 34.0 g/dL  Auto Neutrophil # : 10.6 K/uL  Auto Lymphocyte # : 1.0 K/uL  Auto Monocyte # : 0.7 K/uL  Auto Eosinophil # : 0.0 K/uL  Auto Basophil # : x  Auto Neutrophil % : 85.7 %  Auto Lymphocyte % : 8.2 %  Auto Monocyte % : 5.8 %  Auto Eosinophil % : 0.1 %  Auto Basophil % : x    03-07    138  |  100  |  16.0  ----------------------------<  109  3.8   |  24.0  |  0.40<L>    Ca    8.0<L>      07 Mar 2019 05:05  Phos  3.1     03-07  Mg     2.0     03-07    TPro  5.5<L>  /  Alb  3.2<L>  /  TBili  1.4  /  DBili  0.4<H>  /  AST  42<H>  /  ALT  23  /  AlkPhos  62  03-06    PT/INR - ( 06 Mar 2019 16:58 )   PT: 10.2 sec;   INR: 0.89 ratio         PTT - ( 06 Mar 2019 16:58 )  PTT:25.3 sec    RECENT CULTURES:      LIVER FUNCTIONS - ( 06 Mar 2019 16:58 )  Alb: 3.2 g/dL / Pro: 5.5 g/dL / ALK PHOS: 62 U/L / ALT: 23 U/L / AST: 42 U/L / GGT: x           CARDIAC MARKERS ( 06 Mar 2019 16:58 )  x     / <0.01 ng/mL / 691 U/L / x     / 10.1 ng/mL      CAPILLARY BLOOD GLUCOSE      RADIOLOGY & ADDITIONAL STUDIES:    ASSESSMENT/PLAN:  73y Male presenting with: Complicated UTI. No SN of Infected stone causing urosepsis or Pyelo. afib converted to NSR, right femoral neck fracture, L1 compression fracture and alcohol dependence / ETOH Withdrawal.       Neurological: continue controlling alcohol withdraw with IVP Ativan and precedex. Plan for MRI when stable.      Pulmonary: Wean FiO2 to lowest to maintain sat of 91% given heavy Tob smoking Hx.    Cardiovascular:  Will Get echo to complete A-fib YOUNG and Cardiac clearance for eventual OR.  Will Continue Amio drip at this time and PRN BB.  Hold oral meds     Gastrointestinal: NPO.  Pt currently refusing NGT Placement and threatens physical harm to staff if we attempt.  I will not sedate pt at this time for this procedure.  We will attempt additional S/S eval when more awake.    Genitourinary: continuing zosyn for Complicated UTI    Heme: CW Lovenox 30 BID    ID: [email or web address]    Dispo: Continue care as above.  Will remain in SICU while needs for ETOH withdrawal are high.       CARE TIME SPENT: 30 min

## 2019-03-07 NOTE — PROGRESS NOTE ADULT - SUBJECTIVE AND OBJECTIVE BOX
Subjective:73yMale s/p fall, hip fx, ETOH abuse, left renal pelvis stone.  Pt resting comfortably in bed, confused, not able to answer questions well.     Parr: in place, clear yellow urine    Vital Signs Last 24 Hrs  T(C): 37 (07 Mar 2019 11:00), Max: 37.1 (06 Mar 2019 21:00)  T(F): 98.6 (07 Mar 2019 11:00), Max: 98.7 (06 Mar 2019 21:00)  HR: 81 (07 Mar 2019 13:00) (81 - 99)  BP: 94/56 (07 Mar 2019 13:00) (94/56 - 158/70)  BP(mean): 70 (07 Mar 2019 13:00) (70 - 106)  RR: 19 (07 Mar 2019 13:00) (18 - 25)  SpO2: 98% (07 Mar 2019 13:00) (92% - 99%)  I&O's Detail    06 Mar 2019 07:01  -  07 Mar 2019 07:00  --------------------------------------------------------  IN:    amiodarone Infusion: 66.6 mL    amiodarone Infusion: 200.4 mL    diltiazem Infusion: 30 mL    multiple electrolytes Injection Type 1: 1800 mL    Solution: 499.8 mL    Solution: 400 mL    Solution: 150 mL  Total IN: 3146.8 mL    OUT:    Indwelling Catheter - Urethral: 4125 mL  Total OUT: 4125 mL    Total NET: -978.2 mL      07 Mar 2019 07:01  -  07 Mar 2019 13:53  --------------------------------------------------------  IN:    amiodarone Infusion: 100.2 mL    dexmedetomidine Infusion: 20.6 mL    IV PiggyBack: 100 mL    multiple electrolytes Injection Type 1: 585 mL    Solution: 100 mL  Total IN: 905.8 mL    OUT:    Indwelling Catheter - Urethral: 1475 mL  Total OUT: 1475 mL    Total NET: -569.2 mL          Labs:                        12.6   12.3  )-----------( 95       ( 07 Mar 2019 06:21 )             37.1     03-07    138  |  100  |  16.0  ----------------------------<  109  3.8   |  24.0  |  0.40<L>    Ca    8.0<L>      07 Mar 2019 05:05  Phos  3.1     03-07  Mg     2.0     03-07    TPro  5.5<L>  /  Alb  3.2<L>  /  TBili  1.4  /  DBili  0.4<H>  /  AST  42<H>  /  ALT  23  /  AlkPhos  62  03-06    PT/INR - ( 06 Mar 2019 16:58 )   PT: 10.2 sec;   INR: 0.89 ratio         PTT - ( 06 Mar 2019 16:58 )  PTT:25.3 sec    < from: US Renal (03.06.19 @ 19:00) >   EXAM:  US KIDNEY(S)                          PROCEDURE DATE:  03/06/2019          INTERPRETATION:  CLINICAL INFORMATION: CT scan done earlier in the day   demonstrated large left renal pelvic stone    COMPARISON: CT scan of the abdomen and pelvis of earlier in the day.    TECHNIQUE: Sonography of the kidneys and bladder.     FINDINGS:    Right kidney:  13.2 cm. No renal mass, hydronephrosis or calculi. Small   lower pole cysts are noted.    Left kidney:  14.5 cm. No renal mass, hydronephrosis or calculi.   Exophytic small cyst is seen in the upper pole. A larger cyst is seen in   the midpole laterally measuring 4.1 x 4.7 x 4.7 cm. The known renal   pelvic stone is not appreciated.    Urinary bladder: Nondistended and the presence of a Parr catheter    IMPRESSION:     Bilateral renal cysts. The renal pelvic stone seen on the outside CAT   scan is not appreciated on the ultrasound likely due to technical factors   and position of the stone.

## 2019-03-07 NOTE — CONSULT NOTE ADULT - ASSESSMENT
A/P:    74yo male A&O to self and events with PMH Right eye blindness, depression, SI, ETOH abuse, current smoker (2ppd/90ppy) (further Hx unknown due to not f/u with outpatient care) who was [facility]/p [facility] and new onset AFib. While at [facility] was in PAF for approx 2hrs, currently remains in NSR while on amiodarone and cardizem infusion.  EKG shown no acute ischemic changes. Trop neg. x1.  CXR possible pneumonia.    1- PAF:  - EKG/Tele, maintains NSR  - Echo  - c/w amiodarone infusion consider switch to PO when able  - consider change of cardizem to PRN and add Lopressor IV, PO when able  - due to increase risk of falls, not candidate for long term AC (CHADVASC 1, given known Hx)  - eventual ischemic w/u when stable A/P:    72yo male A&O to self and events with PMH Right eye blindness, depression, SI, ETOH abuse, current smoker (2ppd/90ppy), further Hx unknown, who doesn't f/u with outpatient care, was transferred 3/6 from [facility] s/p fall and new onset AFib. While at [facility] was in PAF for approx 2hrs, currently remains in NSR while on amiodarone and cardizem infusion.  EKG shown no acute ischemic changes. Trop neg. x1.  CXR possible pneumonia.    1- PAF:  - EKG/Tele, maintains NSR  - Echo  - c/w amiodarone infusion  - consider change of cardizem to PRN and add Lopressor IV, PO when able  - due to increase risk of falls, not candidate for long term AC (CHADVASC 1, given known Hx)  - consider ASA  - eventual ischemic w/u when stable    2- DVT prophylaxis  - VCD    3- Smoking cessation  - provide education prior to DC A/P:    74yo male A&O to self and events with PMH Right eye blindness, depression, SI, ETOH abuse, current smoker (2ppd/90ppy), further Hx unknown, who doesn't f/u with outpatient care, [place] [facility] new onset [place]ib. [place] [facility] [place] in PAF for approx 2hrs, currently remains in NSR while on amiodarone and cardizem infusion.  EKG shown no acute ischemic changes. Trop neg. x1.  CXR possible pneumonia.    1- PAF:  - EKG/Tele, maintains NSR  - Echo  - c/w amiodarone infusion  - consider change of cardizem to PRN and add Lopressor IV, PO when able  - due to increase risk of falls, not candidate for long term AC (CHADVASC 1, given known Hx)  - consider ASA  - eventual ischemic w/u when stable    2. Marisela-operative Cardiac Risk Stratification   - RCRI risk 0.4%  - Unable to evaluate functional capacity as pt is a poor historian  - TTE with normal BiV function, no RWMA, no significant valvular abnormalities  - No active chest pain, evidence of ischemia, decompensated CHF, significant valvular abnormality, or unstable arrhythmia and therefore no absolute cardiac contraindication to the planned surgical procedure.     3- DVT prophylaxis  - Cont LMWH for DVT ppx     4- Smoking cessation  - provide education prior to DC

## 2019-03-08 ENCOUNTER — TRANSCRIPTION ENCOUNTER (OUTPATIENT)
Age: 74
End: 2019-03-08

## 2019-03-08 LAB
ABO RH CONFIRMATION: SIGNIFICANT CHANGE UP
ANION GAP SERPL CALC-SCNC: 12 MMOL/L — SIGNIFICANT CHANGE UP (ref 5–17)
BLD GP AB SCN SERPL QL: SIGNIFICANT CHANGE UP
BUN SERPL-MCNC: 13 MG/DL — SIGNIFICANT CHANGE UP (ref 8–20)
CALCIUM SERPL-MCNC: 7.9 MG/DL — LOW (ref 8.6–10.2)
CHLORIDE SERPL-SCNC: 102 MMOL/L — SIGNIFICANT CHANGE UP (ref 98–107)
CO2 SERPL-SCNC: 27 MMOL/L — SIGNIFICANT CHANGE UP (ref 22–29)
CREAT SERPL-MCNC: 0.43 MG/DL — LOW (ref 0.5–1.3)
EOSINOPHIL # BLD AUTO: 0 K/UL — SIGNIFICANT CHANGE UP (ref 0–0.5)
EOSINOPHIL NFR BLD AUTO: 0.5 % — SIGNIFICANT CHANGE UP (ref 0–5)
GLUCOSE BLDC GLUCOMTR-MCNC: 102 MG/DL — HIGH (ref 70–99)
GLUCOSE BLDC GLUCOMTR-MCNC: 91 MG/DL — SIGNIFICANT CHANGE UP (ref 70–99)
GLUCOSE SERPL-MCNC: 116 MG/DL — HIGH (ref 70–115)
HCT VFR BLD CALC: 30.6 % — LOW (ref 42–52)
HGB BLD-MCNC: 10.4 G/DL — LOW (ref 14–18)
LYMPHOCYTES # BLD AUTO: 0.7 K/UL — LOW (ref 1–4.8)
LYMPHOCYTES # BLD AUTO: 11.7 % — LOW (ref 20–55)
MAGNESIUM SERPL-MCNC: 1.8 MG/DL — SIGNIFICANT CHANGE UP (ref 1.6–2.6)
MCHC RBC-ENTMCNC: 31.7 PG — HIGH (ref 27–31)
MCHC RBC-ENTMCNC: 34 G/DL — SIGNIFICANT CHANGE UP (ref 32–36)
MCV RBC AUTO: 93.3 FL — SIGNIFICANT CHANGE UP (ref 80–94)
MONOCYTES # BLD AUTO: 0.2 K/UL — SIGNIFICANT CHANGE UP (ref 0–0.8)
MONOCYTES NFR BLD AUTO: 3.8 % — SIGNIFICANT CHANGE UP (ref 3–10)
NEUTROPHILS # BLD AUTO: 4.8 K/UL — SIGNIFICANT CHANGE UP (ref 1.8–8)
NEUTROPHILS NFR BLD AUTO: 83.8 % — HIGH (ref 37–73)
PHOSPHATE SERPL-MCNC: 2.5 MG/DL — SIGNIFICANT CHANGE UP (ref 2.4–4.7)
PLATELET # BLD AUTO: 70 K/UL — LOW (ref 150–400)
POTASSIUM SERPL-MCNC: 3 MMOL/L — LOW (ref 3.5–5.3)
POTASSIUM SERPL-SCNC: 3 MMOL/L — LOW (ref 3.5–5.3)
RBC # BLD: 3.28 M/UL — LOW (ref 4.6–6.2)
RBC # FLD: 16.5 % — HIGH (ref 11–15.6)
SODIUM SERPL-SCNC: 141 MMOL/L — SIGNIFICANT CHANGE UP (ref 135–145)
TYPE + AB SCN PNL BLD: SIGNIFICANT CHANGE UP
WBC # BLD: 5.8 K/UL — SIGNIFICANT CHANGE UP (ref 4.8–10.8)
WBC # FLD AUTO: 5.8 K/UL — SIGNIFICANT CHANGE UP (ref 4.8–10.8)

## 2019-03-08 PROCEDURE — 36569 INSJ PICC 5 YR+ W/O IMAGING: CPT

## 2019-03-08 PROCEDURE — 27235 TREAT THIGH FRACTURE: CPT | Mod: 79,RT

## 2019-03-08 PROCEDURE — 76937 US GUIDE VASCULAR ACCESS: CPT | Mod: 26,59

## 2019-03-08 RX ORDER — METOPROLOL TARTRATE 50 MG
5 TABLET ORAL EVERY 6 HOURS
Qty: 0 | Refills: 0 | Status: DISCONTINUED | OUTPATIENT
Start: 2019-03-08 | End: 2019-03-09

## 2019-03-08 RX ORDER — POTASSIUM CHLORIDE 20 MEQ
10 PACKET (EA) ORAL ONCE
Qty: 0 | Refills: 0 | Status: COMPLETED | OUTPATIENT
Start: 2019-03-08 | End: 2019-03-08

## 2019-03-08 RX ORDER — POTASSIUM CHLORIDE 20 MEQ
10 PACKET (EA) ORAL
Qty: 0 | Refills: 0 | Status: COMPLETED | OUTPATIENT
Start: 2019-03-08 | End: 2019-03-08

## 2019-03-08 RX ORDER — MAGNESIUM SULFATE 500 MG/ML
1 VIAL (ML) INJECTION ONCE
Qty: 0 | Refills: 0 | Status: COMPLETED | OUTPATIENT
Start: 2019-03-08 | End: 2019-03-08

## 2019-03-08 RX ORDER — CEFAZOLIN SODIUM 1 G
2000 VIAL (EA) INJECTION ONCE
Qty: 0 | Refills: 0 | Status: COMPLETED | OUTPATIENT
Start: 2019-03-08 | End: 2019-03-08

## 2019-03-08 RX ADMIN — Medication 1 PATCH: at 07:00

## 2019-03-08 RX ADMIN — CHLORHEXIDINE GLUCONATE 1 APPLICATION(S): 213 SOLUTION TOPICAL at 12:51

## 2019-03-08 RX ADMIN — Medication 5 MILLIGRAM(S): at 12:51

## 2019-03-08 RX ADMIN — Medication 100 MILLIEQUIVALENT(S): at 10:07

## 2019-03-08 RX ADMIN — Medication 3 MILLILITER(S): at 14:56

## 2019-03-08 RX ADMIN — PIPERACILLIN AND TAZOBACTAM 25 GRAM(S): 4; .5 INJECTION, POWDER, LYOPHILIZED, FOR SOLUTION INTRAVENOUS at 15:57

## 2019-03-08 RX ADMIN — Medication 100 MILLIEQUIVALENT(S): at 12:45

## 2019-03-08 RX ADMIN — Medication 5 MILLIGRAM(S): at 17:52

## 2019-03-08 RX ADMIN — Medication 105 MILLIGRAM(S): at 15:58

## 2019-03-08 RX ADMIN — Medication 3 MILLILITER(S): at 08:44

## 2019-03-08 RX ADMIN — MORPHINE SULFATE 2 MILLIGRAM(S): 50 CAPSULE, EXTENDED RELEASE ORAL at 13:24

## 2019-03-08 RX ADMIN — MORPHINE SULFATE 2 MILLIGRAM(S): 50 CAPSULE, EXTENDED RELEASE ORAL at 05:31

## 2019-03-08 RX ADMIN — MORPHINE SULFATE 2 MILLIGRAM(S): 50 CAPSULE, EXTENDED RELEASE ORAL at 13:20

## 2019-03-08 RX ADMIN — Medication 3 MILLILITER(S): at 03:38

## 2019-03-08 RX ADMIN — MORPHINE SULFATE 2 MILLIGRAM(S): 50 CAPSULE, EXTENDED RELEASE ORAL at 05:46

## 2019-03-08 RX ADMIN — Medication 100 MILLIEQUIVALENT(S): at 13:55

## 2019-03-08 RX ADMIN — Medication 1 PATCH: at 15:33

## 2019-03-08 RX ADMIN — MORPHINE SULFATE 2 MILLIGRAM(S): 50 CAPSULE, EXTENDED RELEASE ORAL at 17:38

## 2019-03-08 RX ADMIN — Medication 105 MILLIGRAM(S): at 06:31

## 2019-03-08 RX ADMIN — Medication 100 MILLIEQUIVALENT(S): at 12:51

## 2019-03-08 RX ADMIN — PIPERACILLIN AND TAZOBACTAM 25 GRAM(S): 4; .5 INJECTION, POWDER, LYOPHILIZED, FOR SOLUTION INTRAVENOUS at 06:30

## 2019-03-08 RX ADMIN — MORPHINE SULFATE 2 MILLIGRAM(S): 50 CAPSULE, EXTENDED RELEASE ORAL at 17:39

## 2019-03-08 RX ADMIN — Medication 1 MILLIGRAM(S): at 12:52

## 2019-03-08 RX ADMIN — Medication 100 GRAM(S): at 08:35

## 2019-03-08 RX ADMIN — ENOXAPARIN SODIUM 30 MILLIGRAM(S): 100 INJECTION SUBCUTANEOUS at 06:30

## 2019-03-08 RX ADMIN — Medication 1 PATCH: at 00:00

## 2019-03-08 NOTE — PROCEDURE NOTE - NSPROCDETAILS_GEN_ALL_CORE
sterile technique, catheter placed/ultrasound guidance/location identified, draped/prepped, sterile technique used/sterile dressing applied/supine position/ultrasound assessment

## 2019-03-08 NOTE — DIETITIAN INITIAL EVALUATION ADULT. - OTHER INFO
Pt admitted with sepsis, pyelonephritis, right femoral neck fx and L1 compression fx. Pt currently NPO per SLP recommendations and possible OR today for ORIF. SLP plans to reattempt swallow evaluation 3/9 as schedule permits. Pt refuses NGT. Pt admitted with sepsis, pyelonephritis, right femoral neck fx and L1 compression fx. Pt currently NPO per SLP recommendations and possible OR today for ORIF. SLP plans to reattempt swallow evaluation 3/9 as schedule permits.

## 2019-03-08 NOTE — DIETITIAN INITIAL EVALUATION ADULT. - PHYSICAL APPEARANCE
physical signs of malnutrition [ ]absent [X ]present. [ ]Mild [ ]Moderate [X ]Severe Muscle wasting present at [X] temporal [ X]clavicle [ ]interosseos [ ]calf. [ ]Mild [ ]Moderate [ X]Severe subcutaneous fat loss presents at [ ]ribs [X] orbital region [ ]triceps [X ]buccal area.

## 2019-03-08 NOTE — BRIEF OPERATIVE NOTE - COMMENTS
post-op plan: protected weight bearing RLE, elevate RLE, PT/OT, ancef per SCIP, LMWH per primary team, f/u ortho 3/28

## 2019-03-08 NOTE — DIETITIAN INITIAL EVALUATION ADULT. - NS AS NUTRI INTERV MEDICAL AND FOOD SUPPLEMENTS
Commercial beverage/Once/if po diet advance Rx: 8oz Ensure Enlive TID Po Commercial beverage/Once/if po diet advanced Rx: 8oz Ensure Enlive TID Po

## 2019-03-08 NOTE — DIETITIAN INITIAL EVALUATION ADULT. - NS AS NUTRI INTERV ENTERAL NUTRITION
If unable to advance po diet readdress possible enteral feeds. If unable to advance po diet readdress NGT for enteral feeds. - aware pt had been refusing NGT.

## 2019-03-08 NOTE — DIETITIAN INITIAL EVALUATION ADULT. - FACTORS AFF FOOD INTAKE
persistent lack of appetite/ETOH abuse/withdrawal persistent lack of appetite/ETOH abuse/withdrawal, pyelonephritis

## 2019-03-08 NOTE — CHART NOTE - NSCHARTNOTEFT_GEN_A_CORE
Upon Nutritional Assessment by the Registered Dietitian your patient was determined to meet criteria / has evidence of the following diagnosis/diagnoses:          [ ]  Mild Protein Calorie Malnutrition        [ ]  Moderate Protein Calorie Malnutrition        [X ] Severe Protein Calorie Malnutrition (acute)        [ ] Unspecified Protein Calorie Malnutrition        [ ] Underweight / BMI <19        [ ] Morbid Obesity / BMI > 40      Findings as based on:  •  Comprehensive nutrition assessment and consultation  •  Calorie counts (nutrient intake analysis)  •  Food acceptance and intake status from observations by staff  •  Follow up  •  Patient education  •  Intervention secondary to interdisciplinary rounds  •   concerns      Treatment:    The following diet has been recommended:  Advance po diet as medically feasible per SLP recommendations for consistency.  As/if po diet advanced Rx: 8oz Ensure Enlive TID po  Continue MVI, folic acid, thiamine daily.   If unable to advance po diet readdress possible NGT for enteral feeds.      PROVIDER Section:     By signing this assessment you are acknowledging and agree with the diagnosis/diagnoses assigned by the Registered Dietitian    Comments:

## 2019-03-08 NOTE — PROGRESS NOTE ADULT - SUBJECTIVE AND OBJECTIVE BOX
Acute Care Surgery / Trauma Surgery / SICU  [facility] [place]  ===============================  Interval/Overnight Events: precedex now off.  no overt signs of ETOH withdrawal. failed speech and swallow. rate controlled afib    VITAL SIGNS:  T(C): 37.3 (19 @ 07:00), Max: 43 (19 @ 20:00)  HR: 104 (19 @ 10:00) (77 - 109)  BP: 99/58 (19 @ 10:00) (90/55 - 121/70)  ABP: --  ABP(mean): --  RR: 21 (19 @ 10:00) (17 - 24)  SpO2: 93% (19 @ 10:00) (93% - 100%)  CVP(mm Hg): --    NEUROLOGY:  Neurologic Medications:  chlordiazePOXIDE 50 milliGRAM(s) Oral every 6 hours  dexmedetomidine Infusion 0.3 MICROgram(s)/kG/Hr IV Continuous <Continuous>  morphine  - Injectable 2 milliGRAM(s) IV Push every 4 hours PRN    Exam: follows commands    RESPIRATORY:  Respiratory Medications:  ALBUTerol/ipratropium for Nebulization 3 milliLiter(s) Nebulizer every 6 hours    Exam: 3L NC  ABG - ( 06 Mar 2019 16:51 )  pH: 7.50  /  pCO2: 35    /  pO2: 54    / HCO3: 28    / Base Excess: 4.0   /  SaO2: 89    / Lactate: x          CARDIOVASCULAR  Cardiovascular Medications:  amiodarone Infusion 0.5 mG/Min IV Continuous <Continuous>  metoprolol tartrate Injectable 5 milliGRAM(s) IV Push every 6 hours    Exam: irreg irreg. rate controlled afib HR , MAPs 70s      Metabolic/FLUIDS/ELECTROLYTES/NUTRITION:  Gastrointestinal Medications:  dextrose 5%. 1000 milliLiter(s) IV Continuous <Continuous>  folic acid Injectable 1 milliGRAM(s) IV Push daily  multiple electrolytes Injection Type 1 1000 milliLiter(s) IV Continuous <Continuous>  multivitamin 1 Tablet(s) Oral daily  potassium chloride  10 mEq/100 mL IVPB 10 milliEquivalent(s) IV Intermittent every 1 hour  thiamine IVPB 500 milliGRAM(s) IV Intermittent every 8 hours    I&O's Detail  I&O's Detail    07 Mar 2019 07:  -  08 Mar 2019 07:00  --------------------------------------------------------  IN:    amiodarone Infusion: 400.8 mL    dexmedetomidine Infusion: 38.5 mL    IV PiggyBack: 100 mL    multiple electrolytes Injection Type 1: 1935 mL    Solution: 200 mL    Solution: 275 mL  Total IN: 2949.3 mL    OUT:    Indwelling Catheter - Urethral: 2645 mL  Total OUT: 2645 mL    Total NET: 304.3 mL      08 Mar 2019 07:  -  08 Mar 2019 11:18  --------------------------------------------------------  IN:    amiodarone Infusion: 66.4 mL    multiple electrolytes Injection Type 1: 300 mL  Total IN: 366.4 mL    OUT:    Indwelling Catheter - Urethral: 215 mL  Total OUT: 215 mL    Total NET: 151.4 mL        Daily Weight in k.5 (06 Mar 2019 15:46)      141  |  102  |  13.0  ----------------------------<  116<H>  3.0<L>   |  27.0  |  0.43<L>    Ca    7.9<L>      08 Mar 2019 07:06  Phos  2.5     03-08  Mg     1.8     08    TPro  5.5<L>  /  Alb  3.2<L>  /  TBili  1.4  /  DBili  0.4<H>  /  AST  42<H>  /  ALT  23  /  AlkPhos  62  -06      Diet: NPO      Endocrine:  CAPILLARY BLOOD GLUCOSE      POCT Blood Glucose.: 121 mg/dL (07 Mar 2019 18:38)  POCT Blood Glucose.: 106 mg/dL (07 Mar 2019 12:45)      HEMATOLOGIC:  Hematologic/Oncologic Medications:  enoxaparin Injectable 30 milliGRAM(s) SubCutaneous every 12 hours    [x ] DVT Prophylaxis: lovenox                                              10.4                  Neurophils% (auto):   83.8   (03-08 @ 07:06):    5.8  )-----------(70           Lymphocytes% (auto):  11.7                                          30.6                   Eosinphils% (auto):   0.5      Manual%: Neutrophils x    ; Lymphocytes x    ; Eosinophils x    ; Bands%: x    ; Blasts x              Comments:    INFECTIOUS DISEASE:    Antimicrobials/Immunologic Medications:  ceFAZolin   IVPB 2000 milliGRAM(s) IV Intermittent once  piperacillin/tazobactam IVPB. 3.375 Gram(s) IV Intermittent every 8 hours    RECENT CULTURES:          ASSESSMENT/PLAN:  73y male s/p fall with pyelonephritis and afib RVR transferred from OSH per urology request from OSH    R femoral neck fx  L1 compression fx  Afib RVR  Pyelonephritis  ETOH abuse      Neuro: ETOH abuse, encephalopathic secondary to infection and ETOH withdrawal. Target Rass 0, and provide adequate analgesic and anxiolysis. CIWA protocol    CV: afib RVR now rate controlled. continue amio gtt until PO can be started. Start BB IV. Again, will start oral agents when passes speech/swallow    Pulm: encourage IS if able to orient patient. Wean supplemental O2 to maintain O2 saturation >95%    GI/Nutrition: NPO. Needs another speech/swallow evaluation    /Renal: UOP adequate. Continue abx for pyelonephritis.      ENDO: Maintain Euglycemia.    HEME: thrombocytopenia. monitor.    ID: pyelonephritis. continue abx. follow up blood cultures and ensure no evidence of bacteremia.      Lines/Tubes: velazquez catheter    MSK: possible ORIF for fem fx today.  Keep NPO. TLSO brace for L1 compression fx    Proph: lovenox    Dispo: ICU for afib RVR and ETOH withdrawal.

## 2019-03-08 NOTE — BRIEF OPERATIVE NOTE - PROCEDURE
<<-----Click on this checkbox to enter Procedure Hip fracture pinning pathway  03/08/2019  synthes 7.3 mm cannulated screws x3  Active  MLINN

## 2019-03-09 LAB
ALBUMIN SERPL ELPH-MCNC: 2.7 G/DL — LOW (ref 3.3–5.2)
ALP SERPL-CCNC: 58 U/L — SIGNIFICANT CHANGE UP (ref 40–120)
ALT FLD-CCNC: 14 U/L — SIGNIFICANT CHANGE UP
ANION GAP SERPL CALC-SCNC: 12 MMOL/L — SIGNIFICANT CHANGE UP (ref 5–17)
ANION GAP SERPL CALC-SCNC: 13 MMOL/L — SIGNIFICANT CHANGE UP (ref 5–17)
AST SERPL-CCNC: 23 U/L — SIGNIFICANT CHANGE UP
BILIRUB DIRECT SERPL-MCNC: 0.1 MG/DL — SIGNIFICANT CHANGE UP (ref 0–0.3)
BILIRUB INDIRECT FLD-MCNC: 0.3 MG/DL — SIGNIFICANT CHANGE UP (ref 0.2–1)
BILIRUB SERPL-MCNC: 0.4 MG/DL — SIGNIFICANT CHANGE UP (ref 0.4–2)
BUN SERPL-MCNC: 17 MG/DL — SIGNIFICANT CHANGE UP (ref 8–20)
BUN SERPL-MCNC: 9 MG/DL — SIGNIFICANT CHANGE UP (ref 8–20)
CALCIUM SERPL-MCNC: 7.6 MG/DL — LOW (ref 8.6–10.2)
CALCIUM SERPL-MCNC: 8.1 MG/DL — LOW (ref 8.6–10.2)
CHLORIDE SERPL-SCNC: 98 MMOL/L — SIGNIFICANT CHANGE UP (ref 98–107)
CHLORIDE SERPL-SCNC: 99 MMOL/L — SIGNIFICANT CHANGE UP (ref 98–107)
CO2 SERPL-SCNC: 24 MMOL/L — SIGNIFICANT CHANGE UP (ref 22–29)
CO2 SERPL-SCNC: 26 MMOL/L — SIGNIFICANT CHANGE UP (ref 22–29)
CREAT SERPL-MCNC: 0.46 MG/DL — LOW (ref 0.5–1.3)
CREAT SERPL-MCNC: 0.62 MG/DL — SIGNIFICANT CHANGE UP (ref 0.5–1.3)
EOSINOPHIL # BLD AUTO: 0 K/UL — SIGNIFICANT CHANGE UP (ref 0–0.5)
EOSINOPHIL NFR BLD AUTO: 0.2 % — SIGNIFICANT CHANGE UP (ref 0–5)
GLUCOSE BLDC GLUCOMTR-MCNC: 123 MG/DL — HIGH (ref 70–99)
GLUCOSE BLDC GLUCOMTR-MCNC: 124 MG/DL — HIGH (ref 70–99)
GLUCOSE BLDC GLUCOMTR-MCNC: 162 MG/DL — HIGH (ref 70–99)
GLUCOSE BLDC GLUCOMTR-MCNC: 199 MG/DL — HIGH (ref 70–99)
GLUCOSE SERPL-MCNC: 154 MG/DL — HIGH (ref 70–115)
GLUCOSE SERPL-MCNC: 187 MG/DL — HIGH (ref 70–115)
HCT VFR BLD CALC: 32.5 % — LOW (ref 42–52)
HGB BLD-MCNC: 11.1 G/DL — LOW (ref 14–18)
LYMPHOCYTES # BLD AUTO: 0.3 K/UL — LOW (ref 1–4.8)
LYMPHOCYTES # BLD AUTO: 4.9 % — LOW (ref 20–55)
MAGNESIUM SERPL-MCNC: 1.9 MG/DL — SIGNIFICANT CHANGE UP (ref 1.6–2.6)
MAGNESIUM SERPL-MCNC: 1.9 MG/DL — SIGNIFICANT CHANGE UP (ref 1.6–2.6)
MCHC RBC-ENTMCNC: 32.3 PG — HIGH (ref 27–31)
MCHC RBC-ENTMCNC: 34.2 G/DL — SIGNIFICANT CHANGE UP (ref 32–36)
MCV RBC AUTO: 94.5 FL — HIGH (ref 80–94)
MONOCYTES # BLD AUTO: 0.2 K/UL — SIGNIFICANT CHANGE UP (ref 0–0.8)
MONOCYTES NFR BLD AUTO: 2.8 % — LOW (ref 3–10)
NEUTROPHILS # BLD AUTO: 6 K/UL — SIGNIFICANT CHANGE UP (ref 1.8–8)
NEUTROPHILS NFR BLD AUTO: 91.9 % — HIGH (ref 37–73)
PHOSPHATE SERPL-MCNC: 2.8 MG/DL — SIGNIFICANT CHANGE UP (ref 2.4–4.7)
PHOSPHATE SERPL-MCNC: 3 MG/DL — SIGNIFICANT CHANGE UP (ref 2.4–4.7)
PLATELET # BLD AUTO: 70 K/UL — LOW (ref 150–400)
POTASSIUM SERPL-MCNC: 3.5 MMOL/L — SIGNIFICANT CHANGE UP (ref 3.5–5.3)
POTASSIUM SERPL-MCNC: 5 MMOL/L — SIGNIFICANT CHANGE UP (ref 3.5–5.3)
POTASSIUM SERPL-SCNC: 3.5 MMOL/L — SIGNIFICANT CHANGE UP (ref 3.5–5.3)
POTASSIUM SERPL-SCNC: 5 MMOL/L — SIGNIFICANT CHANGE UP (ref 3.5–5.3)
PROT SERPL-MCNC: 5.6 G/DL — LOW (ref 6.6–8.7)
RBC # BLD: 3.44 M/UL — LOW (ref 4.6–6.2)
RBC # FLD: 15.9 % — HIGH (ref 11–15.6)
SODIUM SERPL-SCNC: 135 MMOL/L — SIGNIFICANT CHANGE UP (ref 135–145)
SODIUM SERPL-SCNC: 137 MMOL/L — SIGNIFICANT CHANGE UP (ref 135–145)
WBC # BLD: 6.5 K/UL — SIGNIFICANT CHANGE UP (ref 4.8–10.8)
WBC # FLD AUTO: 6.5 K/UL — SIGNIFICANT CHANGE UP (ref 4.8–10.8)

## 2019-03-09 PROCEDURE — 93010 ELECTROCARDIOGRAM REPORT: CPT

## 2019-03-09 PROCEDURE — 99232 SBSQ HOSP IP/OBS MODERATE 35: CPT

## 2019-03-09 RX ORDER — CEFAZOLIN SODIUM 1 G
2000 VIAL (EA) INJECTION EVERY 8 HOURS
Qty: 0 | Refills: 0 | Status: COMPLETED | OUTPATIENT
Start: 2019-03-09 | End: 2019-03-09

## 2019-03-09 RX ORDER — AMIODARONE HYDROCHLORIDE 400 MG/1
0.5 TABLET ORAL
Qty: 900 | Refills: 0 | Status: DISCONTINUED | OUTPATIENT
Start: 2019-03-09 | End: 2019-03-09

## 2019-03-09 RX ORDER — POTASSIUM CHLORIDE 20 MEQ
40 PACKET (EA) ORAL ONCE
Qty: 0 | Refills: 0 | Status: COMPLETED | OUTPATIENT
Start: 2019-03-09 | End: 2019-03-09

## 2019-03-09 RX ORDER — POTASSIUM CHLORIDE 20 MEQ
10 PACKET (EA) ORAL
Qty: 0 | Refills: 0 | Status: DISCONTINUED | OUTPATIENT
Start: 2019-03-09 | End: 2019-03-09

## 2019-03-09 RX ORDER — AMIODARONE HYDROCHLORIDE 400 MG/1
200 TABLET ORAL DAILY
Qty: 0 | Refills: 0 | Status: DISCONTINUED | OUTPATIENT
Start: 2019-03-09 | End: 2019-03-14

## 2019-03-09 RX ADMIN — MORPHINE SULFATE 2 MILLIGRAM(S): 50 CAPSULE, EXTENDED RELEASE ORAL at 05:57

## 2019-03-09 RX ADMIN — Medication 5 MILLIGRAM(S): at 06:09

## 2019-03-09 RX ADMIN — Medication 1: at 17:38

## 2019-03-09 RX ADMIN — MORPHINE SULFATE 2 MILLIGRAM(S): 50 CAPSULE, EXTENDED RELEASE ORAL at 13:31

## 2019-03-09 RX ADMIN — Medication 40 MILLIEQUIVALENT(S): at 11:22

## 2019-03-09 RX ADMIN — Medication 105 MILLIGRAM(S): at 08:43

## 2019-03-09 RX ADMIN — Medication 5 MILLIGRAM(S): at 11:21

## 2019-03-09 RX ADMIN — AMIODARONE HYDROCHLORIDE 200 MILLIGRAM(S): 400 TABLET ORAL at 13:31

## 2019-03-09 RX ADMIN — PIPERACILLIN AND TAZOBACTAM 25 GRAM(S): 4; .5 INJECTION, POWDER, LYOPHILIZED, FOR SOLUTION INTRAVENOUS at 00:40

## 2019-03-09 RX ADMIN — Medication 1: at 12:00

## 2019-03-09 RX ADMIN — Medication 105 MILLIGRAM(S): at 22:31

## 2019-03-09 RX ADMIN — Medication 5 MILLIGRAM(S): at 00:47

## 2019-03-09 RX ADMIN — MORPHINE SULFATE 2 MILLIGRAM(S): 50 CAPSULE, EXTENDED RELEASE ORAL at 13:46

## 2019-03-09 RX ADMIN — ENOXAPARIN SODIUM 30 MILLIGRAM(S): 100 INJECTION SUBCUTANEOUS at 05:56

## 2019-03-09 RX ADMIN — Medication 100 MILLIGRAM(S): at 13:32

## 2019-03-09 RX ADMIN — Medication 1 TABLET(S): at 11:22

## 2019-03-09 RX ADMIN — MORPHINE SULFATE 2 MILLIGRAM(S): 50 CAPSULE, EXTENDED RELEASE ORAL at 05:28

## 2019-03-09 RX ADMIN — AMIODARONE HYDROCHLORIDE 16.67 MG/MIN: 400 TABLET ORAL at 04:53

## 2019-03-09 RX ADMIN — PIPERACILLIN AND TAZOBACTAM 25 GRAM(S): 4; .5 INJECTION, POWDER, LYOPHILIZED, FOR SOLUTION INTRAVENOUS at 06:15

## 2019-03-09 RX ADMIN — Medication 100 MILLIGRAM(S): at 05:53

## 2019-03-09 RX ADMIN — Medication 105 MILLIGRAM(S): at 13:32

## 2019-03-09 RX ADMIN — Medication 1 MILLIGRAM(S): at 11:33

## 2019-03-09 RX ADMIN — Medication 1 MILLIGRAM(S): at 00:18

## 2019-03-09 RX ADMIN — PIPERACILLIN AND TAZOBACTAM 25 GRAM(S): 4; .5 INJECTION, POWDER, LYOPHILIZED, FOR SOLUTION INTRAVENOUS at 13:32

## 2019-03-09 RX ADMIN — ENOXAPARIN SODIUM 30 MILLIGRAM(S): 100 INJECTION SUBCUTANEOUS at 17:38

## 2019-03-09 RX ADMIN — Medication 50 MILLIGRAM(S): at 06:09

## 2019-03-09 RX ADMIN — Medication 105 MILLIGRAM(S): at 01:15

## 2019-03-09 RX ADMIN — Medication 3 MILLILITER(S): at 08:48

## 2019-03-09 RX ADMIN — CHLORHEXIDINE GLUCONATE 1 APPLICATION(S): 213 SOLUTION TOPICAL at 11:23

## 2019-03-09 NOTE — PHYSICAL THERAPY INITIAL EVALUATION ADULT - RANGE OF MOTION EXAMINATION, REHAB EVAL
bilateral lower extremity ROM was WFL (within functional limits)/bilateral upper extremity ROM was WFL (within functional limits)/except right Hip flexion 0-90

## 2019-03-09 NOTE — DISCHARGE NOTE ADULT - MEDICATION SUMMARY - MEDICATIONS TO TAKE
I will START or STAY ON the medications listed below when I get home from the hospital:    acetaminophen 325 mg oral tablet  -- 2 tab(s) by mouth every 6 hours  -- Indication: For Pain    oxyCODONE 5 mg oral tablet  -- 1 tab(s) by mouth every 6 hours, As needed, Severe Pain (7 - 10)  -- Indication: For Pain    aluminum hydroxide-magnesium hydroxide 200 mg-200 mg/5 mL oral suspension  -- 30 milliliter(s) by mouth every 4 hours, As needed, Dyspepsia  -- Indication: For GERD    tamsulosin 0.4 mg oral capsule  -- 1 cap(s) by mouth once a day (at bedtime)  -- Indication: For BPH    amiodarone 200 mg oral tablet  -- 1 tab(s) by mouth once a day  -- Indication: For Afib    ipratropium-albuterol 0.5 mg-2.5 mg/3 mLinhalation solution  -- 3 milliliter(s) inhaled every 6 hours, As Needed  -- Indication: For Wheezing    cephalexin 500 mg oral capsule  -- 1 cap(s) by mouth 4 times a day  -- Indication: For Pyelonephritis    nicotine 14 mg/24 hr transdermal film, extended release  -- 1 patch by transdermal patch once a day  -- Indication: For Smoking cessation    Multiple Vitamins oral tablet  -- 1 tab(s) by mouth once a day  -- Indication: For Nutritional supplementation    thiamine 100 mg oral tablet  -- 1 tab(s) by mouth once a day  -- Indication: For Nutritional supplementation I will START or STAY ON the medications listed below when I get home from the hospital:    acetaminophen 325 mg oral tablet  -- 2 tab(s) by mouth every 6 hours  -- Indication: For Pain    oxyCODONE 5 mg oral tablet  -- 1 tab(s) by mouth every 6 hours, As needed, Severe Pain (7 - 10)  -- Indication: For Pain    aluminum hydroxide-magnesium hydroxide 200 mg-200 mg/5 mL oral suspension  -- 30 milliliter(s) by mouth every 4 hours, As needed, Dyspepsia  -- Indication: For GERD    tamsulosin 0.4 mg oral capsule  -- 1 cap(s) by mouth once a day (at bedtime)  -- Indication: For BPH    amiodarone 200 mg oral tablet  -- 1 tab(s) by mouth once a day  -- Indication: For Afib    ipratropium-albuterol 0.5 mg-2.5 mg/3 mLinhalation solution  -- 3 milliliter(s) inhaled every 6 hours, As Needed  -- Indication: For Wheezing    cephalexin 500 mg oral capsule  -- 1 cap(s) by mouth 4 times a day  -- Indication: For Pyelonephritis    Keflex 500 mg oral capsule  -- 1 cap(s) by mouth 4 times a day   -- Finish all this medication unless otherwise directed by prescriber.    -- Indication: For Pyelonephritis    nicotine 14 mg/24 hr transdermal film, extended release  -- 1 patch by transdermal patch once a day  -- Indication: For Smoking cessation    Multiple Vitamins oral tablet  -- 1 tab(s) by mouth once a day  -- Indication: For Nutritional supplementation    thiamine 100 mg oral tablet  -- 1 tab(s) by mouth once a day  -- Indication: For Nutritional supplementation

## 2019-03-09 NOTE — PHYSICAL THERAPY INITIAL EVALUATION ADULT - CRITERIA FOR SKILLED THERAPEUTIC INTERVENTIONS
impairments found/rehab potential/therapy frequency/predicted duration of therapy intervention/anticipated discharge recommendation/risk reduction/prevention/functional limitations in following categories/anticipated equipment needs at discharge

## 2019-03-09 NOTE — PROVIDER CONTACT NOTE (OTHER) - SITUATION
pt bladder scan post velazquez removal at 130pm. pt bs 157. also patient saline lock leaking question regarding midline access.

## 2019-03-09 NOTE — DISCHARGE NOTE ADULT - HOSPITAL COURSE
Pt is a 73M [place] [facility] [place] [facility] [place]elonephritis, R hip fracture, non-acute L1 fx, kidney stone, and new onset afib. His hip has undergone ORIF and he has worked with PT. His pyelo has been managed with antibiotics (Zosyn transitioned to keflex) and urology has seen the patient for the stone. The stone is not causing obstruction and did not require intervention.  His L1 fracture was managed non operatively with a brace. His afib was initially managed with a cardizem drip in the SICU after failed attempt at chemical cardioversion. He has since converted to NSR and was converted to oral amiodarone. Cardiology is following and will continue to manage the patient in the outpatient setting. Patient is stable: tolerating diet, voiding, ambulating, pain well controlled. He will be discharged to rehab. Pt is a 73M transferred to [facility] from [facility] for management of pyelonephritis, R hip fracture, non-acute L1 fx, kidney stone, and new onset afib. His hip has undergone ORIF and he has worked with PT. His pyelo has been managed with antibiotics (Zosyn transitioned to keflex) and urology has seen the patient for the stone. The stone is not causing obstruction and did not require intervention.  His L1 fracture was managed non operatively with a brace. His afib was initially managed with a cardizem and amiodarone drip at [facility] and upon arrival to the [facility] SICU pt had already converted to normal sinus rhythm.  Pt was transitioned to oral amiodarone. Cardiology was consulted and will continue to manage the patient in the outpatient setting. CHADVASC risk of 1.  Patient is stable: tolerating diet, voiding, ambulating, pain well controlled. He will be discharged to rehab.

## 2019-03-09 NOTE — DISCHARGE NOTE ADULT - CARE PROVIDERS DIRECT ADDRESSES
[email or web address]

## 2019-03-09 NOTE — PHYSICAL THERAPY INITIAL EVALUATION ADULT - PASSIVE RANGE OF MOTION EXAMINATION, REHAB EVAL
bilateral lower extremity Passive ROM was WFL (within functional limits)/bilateral upper extremity Passive ROM was WFL (within functional limits)/except right Hip flexion 0-90

## 2019-03-09 NOTE — PHYSICAL THERAPY INITIAL EVALUATION ADULT - ACTIVE RANGE OF MOTION EXAMINATION, REHAB EVAL
bilateral upper extremity Active ROM was WFL (within functional limits)/bilateral  lower extremity Active ROM was WFL (within functional limits)/except right Hip flexion 0-30

## 2019-03-09 NOTE — DISCHARGE NOTE ADULT - CARE PLAN
Principal Discharge DX:	Closed fracture of neck of right femur, initial encounter  Goal:	Alleviation of pain and symptoms  Assessment and plan of treatment:	Follow up with your orthopedic surgeon in 2 weeks, urology in 2-4 weeks, cardiology in 1-2 weeks. Continue physical therapy, PWB RLE. Resume usual diet.  Contact a physician or present to the nearest emergency room for fever, chills, or pain not relieved by medications. Do not drive while taking prescription pain medications. Principal Discharge DX:	Closed fracture of neck of right femur, initial encounter  Goal:	Alleviation of pain and symptoms  Assessment and plan of treatment:	Follow up with your orthopedic surgeon in 2 weeks, urology in 2-4 weeks, cardiology in 1-2 weeks. Continue physical therapy, PWB RLE. Resume usual diet.  Contact a physician or present to the nearest emergency room for fever, chills, or pain not relieved by medications. Do not drive while taking prescription pain medications.  Secondary Diagnosis:	Atrial fibrillation with RVR  Goal:	Medical management  Assessment and plan of treatment:	Please call and make an appointment with Orthopedic Clinic in 2 weeks, Cardiology Clinic in 1-2 weeks, and Urology clinic in 2-4 weeks after discharge. Also, please call and make an appointment with your primary care physician as per your usual schedule.   Activity: May return to normal activities as tolerated, Avoid heavy lifting or strenuous activity until follow-up appointment.   Diet: May continue regular diet.  Medications: Please take all home medications as prescribed by your primary care doctor. Pain medication has been prescribed for you. Please, take it as it has been prescribed, do not drive or operate heavy machinery while taking narcotics.   Wound Care: Please keep surgical site clean and dry. You may shower, but do not bathe    If confusion, altered mental status, fever, chest pain, shortness of breath, severe or worsening pain, vomiting, change or worsening of medical status, please come back to the emergency room, and in case of emergency call [phone]  Secondary Diagnosis:	Alcohol withdrawal syndrome, with delirium  Goal:	Alcohol cessation  Assessment and plan of treatment:	Please call and make an appointment with Orthopedic Clinic in 2 weeks, Cardiology Clinic in 1-2 weeks, and Urology clinic in 2-4 weeks after discharge. Also, please call and make an appointment with your primary care physician as per your usual schedule.   Activity: May return to normal activities as tolerated, Avoid heavy lifting or strenuous activity until follow-up appointment.   Diet: May continue regular diet.  Medications: Please take all home medications as prescribed by your primary care doctor. Pain medication has been prescribed for you. Please, take it as it has been prescribed, do not drive or operate heavy machinery while taking narcotics.   Wound Care: Please keep surgical site clean and dry. You may shower, but do not bathe    If confusion, altered mental status, fever, chest pain, shortness of breath, severe or worsening pain, vomiting, change or worsening of medical status, please come back to the emergency room, and in case of emergency call 911  Secondary Diagnosis:	Renal calculus, right  Goal:	Continued outpatient follow-up  Assessment and plan of treatment:	Please call and make an appointment with Orthopedic Clinic in 2 weeks, Cardiology Clinic in 1-2 weeks, and Urology clinic in 2-4 weeks after discharge. Also, please call and make an appointment with your primary care physician as per your usual schedule.   Activity: May return to normal activities as tolerated, Avoid heavy lifting or strenuous activity until follow-up appointment.   Diet: May continue regular diet.  Medications: Please take all home medications as prescribed by your primary care doctor. Pain medication has been prescribed for you. Please, take it as it has been prescribed, do not drive or operate heavy machinery while taking narcotics.   Wound Care: Please keep surgical site clean and dry. You may shower, but do not bathe    If confusion, altered mental status, fever, chest pain, shortness of breath, severe or worsening pain, vomiting, change or worsening of medical status, please come back to the emergency room, and in case of emergency call [phone]

## 2019-03-09 NOTE — DISCHARGE NOTE ADULT - PLAN OF CARE
Alleviation of pain and symptoms Follow up with your orthopedic surgeon in 2 weeks, urology in 2-4 weeks, cardiology in 1-2 weeks. Continue physical therapy, PWB RLE. Resume usual diet.  Contact a physician or present to the nearest emergency room for fever, chills, or pain not relieved by medications. Do not drive while taking prescription pain medications. Medical management Please call and make an appointment with Orthopedic Clinic in 2 weeks, Cardiology Clinic in 1-2 weeks, and Urology clinic in 2-4 weeks after discharge. Also, please call and make an appointment with your primary care physician as per your usual schedule.   Activity: May return to normal activities as tolerated, Avoid heavy lifting or strenuous activity until follow-up appointment.   Diet: May continue regular diet.  Medications: Please take all home medications as prescribed by your primary care doctor. Pain medication has been prescribed for you. Please, take it as it has been prescribed, do not drive or operate heavy machinery while taking narcotics.   Wound Care: Please keep surgical site clean and dry. You may shower, but do not bathe    If confusion, altered mental status, fever, chest pain, shortness of breath, severe or worsening pain, vomiting, change or worsening of medical status, please come back to the emergency room, and in case of emergency call [phone] Alcohol cessation Please call and make an appointment with Orthopedic Clinic in 2 weeks, Cardiology Clinic in 1-2 weeks, and Urology clinic in 2-4 weeks after discharge. Also, please call and make an appointment with your primary care physician as per your usual schedule.   Activity: May return to normal activities as tolerated, Avoid heavy lifting or strenuous activity until follow-up appointment.   Diet: May continue regular diet.  Medications: Please take all home medications as prescribed by your primary care doctor. Pain medication has been prescribed for you. Please, take it as it has been prescribed, do not drive or operate heavy machinery while taking narcotics.   Wound Care: Please keep surgical site clean and dry. You may shower, but do not bathe    If confusion, altered mental status, fever, chest pain, shortness of breath, severe or worsening pain, vomiting, change or worsening of medical status, please come back to the emergency room, and in case of emergency call [phone] Continued outpatient follow-up Please call and make an appointment with Orthopedic Clinic in 2 weeks, Cardiology Clinic in 1-2 weeks, and Urology clinic in 2-4 weeks after discharge. Also, please call and make an appointment with your primary care physician as per your usual schedule.   Activity: May return to normal activities as tolerated, Avoid heavy lifting or strenuous activity until follow-up appointment.   Diet: May continue regular diet.  Medications: Please take all home medications as prescribed by your primary care doctor. Pain medication has been prescribed for you. Please, take it as it has been prescribed, do not drive or operate heavy machinery while taking narcotics.   Wound Care: Please keep surgical site clean and dry. You may shower, but do not bathe    If confusion, altered mental status, fever, chest pain, shortness of breath, severe or worsening pain, vomiting, change or worsening of medical status, please come back to the emergency room, and in case of emergency call [phone]

## 2019-03-09 NOTE — DISCHARGE NOTE ADULT - PATIENT PORTAL LINK FT
You can access the [facility] Patient Portal, offered by [facility], by registering with the following website: [email or web address]

## 2019-03-09 NOTE — PROGRESS NOTE ADULT - SUBJECTIVE AND OBJECTIVE BOX
[facility]    [id number]    History:  The patient is status post right hip CRPP, POD # 1. Pt also has L1 compression fx. Patient is doing well. The patient's pain is controlled using the prescribed pain medications. The patient is participating in physical therapy. He attempted to stand with PT today. Denies back pain. No numbness or tingling in extremities. No new complaints. No acute motor or sensory changes are reported.    Vital Signs Last 24 Hrs  T(C): 36.4 (09 Mar 2019 12:00), Max: 37.1 (08 Mar 2019 20:00)  T(F): 97.5 (09 Mar 2019 12:00), Max: 98.8 (08 Mar 2019 20:00)  HR: 82 (09 Mar 2019 14:00) (65 - 98)  BP: 102/56 (09 Mar 2019 14:00) (94/50 - 150/64)  BP(mean): 73 (09 Mar 2019 14:00) (67 - 108)  RR: 14 (09 Mar 2019 14:00) (9 - 26)  SpO2: 98% (09 Mar 2019 14:00) (94% - 100%)  I&O's Summary    08 Mar 2019 07:01  -  09 Mar 2019 07:00  --------------------------------------------------------  IN: 2592.2 mL / OUT: 3290 mL / NET: -697.8 mL    09 Mar 2019 06:01  -  09 Mar 2019 14:22  --------------------------------------------------------  IN: 558.5 mL / OUT: 430 mL / NET: 128.5 mL                              11.1   6.5   )-----------( 70       ( 09 Mar 2019 04:38 )             32.5     03-09    137  |  98  |  9.0  ----------------------------<  187<H>  3.5   |  26.0  |  0.46<L>    Ca    7.6<L>      09 Mar 2019 04:38  Phos  3.0     03-09  Mg     1.9     03-09        MEDICATIONS  (STANDING):  ALBUTerol/ipratropium for Nebulization 3 milliLiter(s) Nebulizer every 6 hours  amiodarone    Tablet 200 milliGRAM(s) Oral daily  chlorhexidine 2% Cloths 1 Application(s) Topical daily  dexmedetomidine Infusion 0.3 MICROgram(s)/kG/Hr (4.972 mL/Hr) IV Continuous <Continuous>  enoxaparin Injectable 30 milliGRAM(s) SubCutaneous every 12 hours  folic acid Injectable 1 milliGRAM(s) IV Push daily  insulin lispro (HumaLOG) corrective regimen sliding scale   SubCutaneous three times a day before meals  metoprolol tartrate Injectable 5 milliGRAM(s) IV Push every 6 hours  multivitamin 1 Tablet(s) Oral daily  nicotine -  14 mG/24Hr(s) Patch 1 patch Transdermal daily  piperacillin/tazobactam IVPB. 3.375 Gram(s) IV Intermittent every 8 hours  thiamine IVPB 500 milliGRAM(s) IV Intermittent every 8 hours    MEDICATIONS  (PRN):  LORazepam   Injectable 1 milliGRAM(s) IV Push every 2 hours PRN CIWA-Ar score increase by 2 points and a total score of 7 or less  morphine  - Injectable 2 milliGRAM(s) IV Push every 4 hours PRN Moderate Pain (4 - 6)      Physical exam: Lying in bed in NAD. The dressing is clean, dry and intact. No wound erythema, discharge, drainage is noted. Sensation to light touch is grossly intact without focal deficit and is symmetric bilaterally. No calf tenderness. Sensation to light touch is grossly intact distally. Motor function distally is 5/5. No foot drop. +ehl/fhl. +df/pf. Able to straight leg raise BLE. 2+ dorsalis pedis pulse. Capillary refill is less than 2 seconds. No cyanosis.    Primary Orthopedic Assessment:  • S/P right hip crpp, POD#1  Secondary  Orthopedic Assessment(s):   • L1 compression fx    Plan:   • DVT prophylaxis as prescribed- Lovenox, including use of compression devices and ankle pumps- per trauma/cardiac  • Continue physical therapy  • protected wb RLE  - LSO when OOB  - MRI T&L spine when stable  • Pain control as clinically indicated  • Incentive spirometry encouraged

## 2019-03-09 NOTE — PROGRESS NOTE ADULT - SUBJECTIVE AND OBJECTIVE BOX
ORTHO-POST-OP PROGRESS NOTE:      [id number]    AXEL ARGUELLES      PROCEDURE: right hip IMN 3/8/19 with Dr. Medina  SUBJECTIVE: 73y Patient seen and examined. Patient reports of moderate discomfort that is controlled by pain medications. Patient denies of acute sensory or motor changes.                             10.4   5.8   )-----------( 70       ( 08 Mar 2019 07:06 )             30.6           T(C): 35.7 (03-09-19 @ 00:00), Max: 37.3 (03-08-19 @ 07:00)  HR: 69 (03-09-19 @ 01:00) (69 - 111)  BP: 105/54 (03-09-19 @ 01:00) (88/55 - 148/81)  RR: 13 (03-09-19 @ 01:00) (13 - 26)  SpO2: 99% (03-09-19 @ 01:00) (93% - 100%)  Wt(kg): --      PHYSICAL EXAM:     Constitutional: Alert, responsive, in no acute distress.   RIGHT LOWER EXTREMITY:  Dressing: c/d/i  calf soft NTTP  DP intact  PF/DF/EHl/FHL intac  SILT  5/5 strength bilaterally                                                      A/P :  71y Male S/P right hip IMN   POD# 0    - Continue care as per primary team  -  Pain control  -  DVT ppx: [x]SCDs   [x] Pharmacolgic  - as per primary team  -  PT and out of bed today  -  Weight bearing status: PWB [x]

## 2019-03-09 NOTE — PROGRESS NOTE ADULT - SUBJECTIVE AND OBJECTIVE BOX
Acute Care Surgery / Trauma Surgery   [facility] [place]  ===============================  Interval/Overnight Events: s/p R hip pinning POD 1. pain controlled overnight. received 1mg ativan post op for agitation and restlessness.     VITAL SIGNS:  T(C): 36.2 (19 @ 08:00), Max: 37.2 (19 @ 12:00)  HR: 75 (19 @ 07:00) (65 - 111)  BP: 118/59 (19 @ 06:00) (88/55 - 148/81)  ABP: --  ABP(mean): --  RR: 13 (19 @ 07:00) (12 - 26)  SpO2: 99% (19 @ 07:00) (93% - 100%)  CVP(mm Hg): --    NEUROLOGY:  Neurologic Medications:  chlordiazePOXIDE 50 milliGRAM(s) Oral every 6 hours  dexmedetomidine Infusion 0.3 MICROgram(s)/kG/Hr IV Continuous <Continuous>  morphine  - Injectable 2 milliGRAM(s) IV Push every 4 hours PRN    Exam: GCS 15, AOx3, no focal deficits  CAM ICU: negative  [ x] Adequacy of sedation and pain control has been assessed and adjusted  Comments:    RESPIRATORY:  Respiratory Medications:  ALBUTerol/ipratropium for Nebulization 3 milliLiter(s) Nebulizer every 6 hours    Exam: CTAB  Mechanical Ventilation: nasal cannula        CARDIOVASCULAR  Cardiovascular Medications:  amiodarone Infusion 0.5 mG/Min IV Continuous <Continuous>  metoprolol tartrate Injectable 5 milliGRAM(s) IV Push every 6 hours    Exam: RRR    Cardiac Rhythm:	sinus  ECHO:  Comments:    Metabolic/FLUIDS/ELECTROLYTES/NUTRITION:  Gastrointestinal Medications:  dextrose 5%. 1000 milliLiter(s) IV Continuous <Continuous>  folic acid Injectable 1 milliGRAM(s) IV Push daily  multiple electrolytes Injection Type 1 1000 milliLiter(s) IV Continuous <Continuous>  multivitamin 1 Tablet(s) Oral daily  potassium chloride    Tablet ER 40 milliEquivalent(s) Oral once  thiamine IVPB 500 milliGRAM(s) IV Intermittent every 8 hours    I&O's Detail  73y  Daily Weight in k.6 (08 Mar 2019 14:42)      137  |  98  |  9.0  ----------------------------<  187<H>  3.5   |  26.0  |  0.46<L>    Ca    7.6<L>      09 Mar 2019 04:38  Phos  3.0       Mg     1.9             Diet: NPO  Comments: f/u S/S    HEMATOLOGIC:  Hematologic/Oncologic Medications:  enoxaparin Injectable 30 milliGRAM(s) SubCutaneous every 12 hours    [x ] DVT Prophylaxis: lovenox                                              11.1                  Neurophils% (auto):   91.9   ( @ 04:38):    6.5  )-----------(70           Lymphocytes% (auto):  4.9                                           32.5                   Eosinphils% (auto):   0.2      Manual%: Neutrophils x    ; Lymphocytes x    ; Eosinophils x    ; Bands%: x    ; Blasts x            Transfusions:	[ ] PRBC	[ ] Platelets	[ ] FFP		[ ] Cryoprecipitate    Comments:    INFECTIOUS DISEASE:  Antimicrobials/Immunologic Medications:  ceFAZolin   IVPB 2000 milliGRAM(s) IV Intermittent every 8 hours  ceFAZolin   IVPB 2000 milliGRAM(s) IV Intermittent once  piperacillin/tazobactam IVPB. 3.375 Gram(s) IV Intermittent every 8 hours    RECENT CULTURES:   @ 17:20 .Blood     No growth at 48 hours       @ 17:18 .Blood     No growth at 48 hours            PATIENT CARE ACCESS DEVICES:  [x ] Peripheral IV  [ ] Central Venous Line	[ ] R	[ ] L	[ ] IJ	[ ] Fem	[ ] SC	Placed:   [ ] Arterial Line		[ ] R	[ ] L	[ ] Fem	[ ] Rad	[ ] Ax	Placed:   [ ] PICC:					[ ] Mediport  [x ] Urinary Catheter, Date Placed:   [x ] Necessity of urinary, arterial, and venous catheters discussed    OTHER MEDICATIONS:  Endocrine/Metabolic Medications:  dextrose 40% Gel 15 Gram(s) Oral once PRN  dextrose 50% Injectable 12.5 Gram(s) IV Push once  dextrose 50% Injectable 25 Gram(s) IV Push once  dextrose 50% Injectable 25 Gram(s) IV Push once  glucagon  Injectable 1 milliGRAM(s) IntraMuscular once PRN  insulin lispro (HumaLOG) corrective regimen sliding scale   SubCutaneous three times a day before meals    Genitourinary Medications:    Topical/Other Medications:  chlorhexidine 2% Cloths 1 Application(s) Topical daily  nicotine -  14 mG/24Hr(s) Patch 1 patch Transdermal daily      IMAGING STUDIES:    ASSESSMENT/PLAN:  73yMaleSepsis with non obstructing left renal stone. new onset afib with RVR now in sinus rhythm, rate controlled. R femoral neck fracture s/p pinning

## 2019-03-09 NOTE — PROGRESS NOTE ADULT - SUBJECTIVE AND OBJECTIVE BOX
CHIEF COMPLAINT:Patient is a 73y old  Male who presents with a chief complaint of s/p found down (09 Mar 2019 08:53)    INTERVAL HISTORY:  PT s/p surgery with afib and RVR.  Now in NSR, on amio drip.  Poor historian. Complains of fatigue. No cp or sob.    Allergies  No Known Allergies    MEDICATIONS:  amiodarone Infusion 0.5 mG/Min IV Continuous <Continuous>  metoprolol tartrate Injectable 5 milliGRAM(s) IV Push every 6 hours  ceFAZolin   IVPB 2000 milliGRAM(s) IV Intermittent every 8 hours  piperacillin/tazobactam IVPB. 3.375 Gram(s) IV Intermittent every 8 hours  ALBUTerol/ipratropium for Nebulization 3 milliLiter(s) Nebulizer every 6 hours  dexmedetomidine Infusion 0.3 MICROgram(s)/kG/Hr IV Continuous <Continuous>  LORazepam   Injectable 1 milliGRAM(s) IV Push every 2 hours PRN  morphine  - Injectable 2 milliGRAM(s) IV Push every 4 hours PRN  insulin lispro (HumaLOG) corrective regimen sliding scale   SubCutaneous three times a day before meals  chlorhexidine 2% Cloths 1 Application(s) Topical daily  enoxaparin Injectable 30 milliGRAM(s) SubCutaneous every 12 hours  folic acid Injectable 1 milliGRAM(s) IV Push daily  multivitamin 1 Tablet(s) Oral daily  thiamine IVPB 500 milliGRAM(s) IV Intermittent every 8 hours    PHYSICAL EXAM:  T(C): 36.2 (03-09-19 @ 08:00), Max: 37.1 (03-08-19 @ 20:00)  HR: 92 (03-09-19 @ 11:00) (65 - 98)  BP: 107/58 (03-09-19 @ 11:00) (94/50 - 150/64)  RR: 15 (03-09-19 @ 11:00) (9 - 26)  SpO2: 98% (03-09-19 @ 11:00) (94% - 100%)  I&O's Summary    08 Mar 2019 07:01  -  09 Mar 2019 07:00  --------------------------------------------------------  IN: 2592.2 mL / OUT: 3290 mL / NET: -697.8 mL    09 Mar 2019 06:01  -  09 Mar 2019 12:33  --------------------------------------------------------  IN: 291.8 mL / OUT: 350 mL / NET: -58.2 mL        Appearance: Normal	  HEENT:   NC/AT  Eye: Pink Conjunctiva  Lungs: CTA B/L  CVS: RRR, Normal S1 and S2, No Edema    TELEMETRY: NSR.     EKG: NSR, 83 BPM, LAFB, QTc 480 msec.	      LABS:	 	    CARDIAC MARKERS:                            11.1   6.5   )-----------( 70       ( 09 Mar 2019 04:38 )             32.5     03-09    137  |  98  |  9.0  ----------------------------<  187<H>  3.5   |  26.0  |  0.46<L>    Ca    7.6<L>      09 Mar 2019 04:38  Phos  3.0     03-09  Mg     1.9     03-09      proBNP:   Lipid Profile:   HgA1c:   TSH:     ASSESSMENT/PLAN:

## 2019-03-09 NOTE — DISCHARGE NOTE ADULT - PROVIDER TOKENS
PROVIDER:[TOKEN:[[id number]]] PROVIDER:[TOKEN:[[id number]]],PROVIDER:[TOKEN:[[id number]]],PROVIDER:[TOKEN:[[id number]]]

## 2019-03-09 NOTE — PHYSICAL THERAPY INITIAL EVALUATION ADULT - DID THE PATIENT HAVE SURGERY?
S/P IMN Right Hip  due to Right femoral neck fracture, L1 and T12 compression fracture due to fall/yes

## 2019-03-09 NOTE — DISCHARGE NOTE ADULT - CARE PROVIDER_API CALL
Abisai Medina)  Orthopaedic Surgery  [address] [place] [address]  Phone: [phone]  Fax: [phone]  Follow Up Time: Abisai Medina)  Orthopaedic Surgery  [address] [place] [address]  Phone: [phone]  Fax: [phone]  Follow Up Time:     Rudolph Estrada)  Urology  [address] [facility], [address]  [place] [address]  Phone: [phone]  Fax: [phone]  Follow Up Time:     Brian Gross)  Cardiovascular Disease  [address] [facility] [address] [facility] [address], [address]  [place] [address]  Phone: [phone]  Fax: [phone]  Follow Up Time:

## 2019-03-09 NOTE — PHYSICAL THERAPY INITIAL EVALUATION ADULT - DIAGNOSIS, PT EVAL
Difficulty walking S/P IMN Right Hip  due to Right femoral neck fracture, L1 and T12 compression fracture due to fall

## 2019-03-09 NOTE — CHART NOTE - NSCHARTNOTEFT_GEN_A_CORE
SICU DOWNGRADE NOTE    Patient seen and evaluated on the floor. This is a gentleman who was found down in home for an unknown duration found to have a R femoral neck fx, afib RVR, and pyelonephritis.    Today, patient states he feels lousy because everyone keeps waking him up when he just wants to sleep. His pain is well controlled. His velazquez was d/c'ed today and thus far, he has no urge to urinate. He  is tolerating a regular diet. He denies chest pain, shortness of breath, f/n/v.    PE:   gen: nad, a&ox3  cv: rrr  resp: nonlabored breathing  gi: soft, nd, nttp  msk: RLE with dressing in place. RUE midline in place.  skin: scattered ecchymosis throughout extremities    A/P:   73yMaleSepsis with non obstructing left renal stone. new onset afib with RVR now in sinus rhythm, rate controlled. R femoral neck fracture s/p pinning. Downgraded from the floor.   - Amio transitioned to PO 200mg  - caroline d/c'ed today, TOV  - CIWA protocol  - Will need AC, but assessed at a later time  - Ortho: RLE WB protected  - f/u speech and swallow for diet recommendations  - zosyn (3/6- ) for pyelo.   - DVT ppx: lovenox  - f/u PT/OT/PMR SICU DOWNGRADE NOTE    Patient seen and evaluated on the floor. This is a gentleman who was found down in home for an unknown duration found to have a R femoral neck fx, afib RVR, and pyelonephritis.    Today, patient states he feels lousy because everyone keeps waking him up when he just wants to sleep. His pain is well controlled. His velazquez was d/c'ed today and thus far, he has no urge to urinate. He  is tolerating a regular diet. He denies chest pain, shortness of breath, f/n/v.    PE:   gen: nad, a&ox3  cv: rrr  resp: nonlabored breathing  gi: soft, nd, nttp  msk: RLE with dressing in place. RUE midline in place.  skin: scattered ecchymosis throughout extremities    A/P:   73yMaleSepsis with non obstructing left renal stone. new onset afib with RVR now in sinus rhythm, rate controlled. R femoral neck fracture s/p pinning. Downgraded from the floor.   - Amio transitioned to PO 200mg  - caroline d/c'ed today, TOV  - CIWA protocol  - Will need AC, but assessed at a later time  - Ortho: RLE WB protected  - zosyn (3/6- ) for pyelo.   - DVT ppx: lovenox  - f/u PT/OT/PMR

## 2019-03-10 LAB
ANION GAP SERPL CALC-SCNC: 10 MMOL/L — SIGNIFICANT CHANGE UP (ref 5–17)
BASOPHILS # BLD AUTO: 0 K/UL — SIGNIFICANT CHANGE UP (ref 0–0.2)
BASOPHILS NFR BLD AUTO: 0.2 % — SIGNIFICANT CHANGE UP (ref 0–2)
BUN SERPL-MCNC: 13 MG/DL — SIGNIFICANT CHANGE UP (ref 8–20)
CALCIUM SERPL-MCNC: 8.3 MG/DL — LOW (ref 8.6–10.2)
CHLORIDE SERPL-SCNC: 100 MMOL/L — SIGNIFICANT CHANGE UP (ref 98–107)
CO2 SERPL-SCNC: 26 MMOL/L — SIGNIFICANT CHANGE UP (ref 22–29)
CREAT SERPL-MCNC: 0.52 MG/DL — SIGNIFICANT CHANGE UP (ref 0.5–1.3)
EOSINOPHIL # BLD AUTO: 0.1 K/UL — SIGNIFICANT CHANGE UP (ref 0–0.5)
EOSINOPHIL NFR BLD AUTO: 1 % — SIGNIFICANT CHANGE UP (ref 0–5)
GLUCOSE BLDC GLUCOMTR-MCNC: 85 MG/DL — SIGNIFICANT CHANGE UP (ref 70–99)
GLUCOSE BLDC GLUCOMTR-MCNC: 94 MG/DL — SIGNIFICANT CHANGE UP (ref 70–99)
GLUCOSE BLDC GLUCOMTR-MCNC: 95 MG/DL — SIGNIFICANT CHANGE UP (ref 70–99)
GLUCOSE SERPL-MCNC: 95 MG/DL — SIGNIFICANT CHANGE UP (ref 70–115)
HCT VFR BLD CALC: 31 % — LOW (ref 42–52)
HGB BLD-MCNC: 10.3 G/DL — LOW (ref 14–18)
LYMPHOCYTES # BLD AUTO: 1 K/UL — SIGNIFICANT CHANGE UP (ref 1–4.8)
LYMPHOCYTES # BLD AUTO: 17.3 % — LOW (ref 20–55)
MAGNESIUM SERPL-MCNC: 1.7 MG/DL — SIGNIFICANT CHANGE UP (ref 1.6–2.6)
MCHC RBC-ENTMCNC: 31.4 PG — HIGH (ref 27–31)
MCHC RBC-ENTMCNC: 33.2 G/DL — SIGNIFICANT CHANGE UP (ref 32–36)
MCV RBC AUTO: 94.5 FL — HIGH (ref 80–94)
MONOCYTES # BLD AUTO: 0.5 K/UL — SIGNIFICANT CHANGE UP (ref 0–0.8)
MONOCYTES NFR BLD AUTO: 8.7 % — SIGNIFICANT CHANGE UP (ref 3–10)
NEUTROPHILS # BLD AUTO: 4.4 K/UL — SIGNIFICANT CHANGE UP (ref 1.8–8)
NEUTROPHILS NFR BLD AUTO: 72.5 % — SIGNIFICANT CHANGE UP (ref 37–73)
PHOSPHATE SERPL-MCNC: 2.2 MG/DL — LOW (ref 2.4–4.7)
PLATELET # BLD AUTO: 98 K/UL — LOW (ref 150–400)
POTASSIUM SERPL-MCNC: 3.6 MMOL/L — SIGNIFICANT CHANGE UP (ref 3.5–5.3)
POTASSIUM SERPL-SCNC: 3.6 MMOL/L — SIGNIFICANT CHANGE UP (ref 3.5–5.3)
RBC # BLD: 3.28 M/UL — LOW (ref 4.6–6.2)
RBC # FLD: 15.9 % — HIGH (ref 11–15.6)
SODIUM SERPL-SCNC: 136 MMOL/L — SIGNIFICANT CHANGE UP (ref 135–145)
WBC # BLD: 6 K/UL — SIGNIFICANT CHANGE UP (ref 4.8–10.8)
WBC # FLD AUTO: 6 K/UL — SIGNIFICANT CHANGE UP (ref 4.8–10.8)

## 2019-03-10 PROCEDURE — 93010 ELECTROCARDIOGRAM REPORT: CPT

## 2019-03-10 PROCEDURE — 99232 SBSQ HOSP IP/OBS MODERATE 35: CPT

## 2019-03-10 RX ORDER — SODIUM,POTASSIUM PHOSPHATES 278-250MG
2 POWDER IN PACKET (EA) ORAL ONCE
Qty: 0 | Refills: 0 | Status: COMPLETED | OUTPATIENT
Start: 2019-03-10 | End: 2019-03-10

## 2019-03-10 RX ORDER — THIAMINE MONONITRATE (VIT B1) 100 MG
100 TABLET ORAL DAILY
Qty: 0 | Refills: 0 | Status: DISCONTINUED | OUTPATIENT
Start: 2019-03-10 | End: 2019-03-14

## 2019-03-10 RX ORDER — TAMSULOSIN HYDROCHLORIDE 0.4 MG/1
0.4 CAPSULE ORAL AT BEDTIME
Qty: 0 | Refills: 0 | Status: DISCONTINUED | OUTPATIENT
Start: 2019-03-10 | End: 2019-03-14

## 2019-03-10 RX ORDER — POTASSIUM CHLORIDE 20 MEQ
20 PACKET (EA) ORAL
Qty: 0 | Refills: 0 | Status: COMPLETED | OUTPATIENT
Start: 2019-03-10 | End: 2019-03-10

## 2019-03-10 RX ORDER — CALCIUM CARBONATE 500(1250)
1 TABLET ORAL ONCE
Qty: 0 | Refills: 0 | Status: COMPLETED | OUTPATIENT
Start: 2019-03-10 | End: 2019-03-10

## 2019-03-10 RX ORDER — MAGNESIUM SULFATE 500 MG/ML
2 VIAL (ML) INJECTION ONCE
Qty: 0 | Refills: 0 | Status: COMPLETED | OUTPATIENT
Start: 2019-03-10 | End: 2019-03-10

## 2019-03-10 RX ADMIN — Medication 2 PACKET(S): at 22:29

## 2019-03-10 RX ADMIN — Medication 50 GRAM(S): at 22:29

## 2019-03-10 RX ADMIN — MORPHINE SULFATE 2 MILLIGRAM(S): 50 CAPSULE, EXTENDED RELEASE ORAL at 03:30

## 2019-03-10 RX ADMIN — Medication 20 MILLIEQUIVALENT(S): at 16:08

## 2019-03-10 RX ADMIN — PIPERACILLIN AND TAZOBACTAM 25 GRAM(S): 4; .5 INJECTION, POWDER, LYOPHILIZED, FOR SOLUTION INTRAVENOUS at 23:28

## 2019-03-10 RX ADMIN — Medication 30 MILLILITER(S): at 13:22

## 2019-03-10 RX ADMIN — TAMSULOSIN HYDROCHLORIDE 0.4 MILLIGRAM(S): 0.4 CAPSULE ORAL at 22:29

## 2019-03-10 RX ADMIN — PIPERACILLIN AND TAZOBACTAM 25 GRAM(S): 4; .5 INJECTION, POWDER, LYOPHILIZED, FOR SOLUTION INTRAVENOUS at 00:02

## 2019-03-10 RX ADMIN — Medication 20 MILLIEQUIVALENT(S): at 18:14

## 2019-03-10 RX ADMIN — CHLORHEXIDINE GLUCONATE 1 APPLICATION(S): 213 SOLUTION TOPICAL at 12:37

## 2019-03-10 RX ADMIN — MORPHINE SULFATE 2 MILLIGRAM(S): 50 CAPSULE, EXTENDED RELEASE ORAL at 13:31

## 2019-03-10 RX ADMIN — Medication 1 TABLET(S): at 18:14

## 2019-03-10 RX ADMIN — PIPERACILLIN AND TAZOBACTAM 25 GRAM(S): 4; .5 INJECTION, POWDER, LYOPHILIZED, FOR SOLUTION INTRAVENOUS at 16:04

## 2019-03-10 RX ADMIN — AMIODARONE HYDROCHLORIDE 200 MILLIGRAM(S): 400 TABLET ORAL at 05:19

## 2019-03-10 RX ADMIN — Medication 1 PATCH: at 19:55

## 2019-03-10 RX ADMIN — Medication 1 TABLET(S): at 12:36

## 2019-03-10 RX ADMIN — ENOXAPARIN SODIUM 30 MILLIGRAM(S): 100 INJECTION SUBCUTANEOUS at 05:27

## 2019-03-10 RX ADMIN — Medication 105 MILLIGRAM(S): at 05:18

## 2019-03-10 RX ADMIN — PIPERACILLIN AND TAZOBACTAM 25 GRAM(S): 4; .5 INJECTION, POWDER, LYOPHILIZED, FOR SOLUTION INTRAVENOUS at 07:10

## 2019-03-10 RX ADMIN — ENOXAPARIN SODIUM 30 MILLIGRAM(S): 100 INJECTION SUBCUTANEOUS at 18:14

## 2019-03-10 RX ADMIN — TAMSULOSIN HYDROCHLORIDE 0.4 MILLIGRAM(S): 0.4 CAPSULE ORAL at 03:15

## 2019-03-10 RX ADMIN — Medication 1 PATCH: at 12:36

## 2019-03-10 RX ADMIN — Medication 3 MILLILITER(S): at 19:32

## 2019-03-10 RX ADMIN — MORPHINE SULFATE 2 MILLIGRAM(S): 50 CAPSULE, EXTENDED RELEASE ORAL at 03:16

## 2019-03-10 NOTE — PROGRESS NOTE ADULT - SUBJECTIVE AND OBJECTIVE BOX
INTERVAL HPI/OVERNIGHT EVENTS: Doing well overnight vealzquez removed, voided this morning 300cc this am.   Otherwise c/o no pain or discomfort       MEDICATIONS  (STANDING):  ALBUTerol/ipratropium for Nebulization 3 milliLiter(s) Nebulizer every 6 hours  amiodarone    Tablet 200 milliGRAM(s) Oral daily  chlorhexidine 2% Cloths 1 Application(s) Topical daily  enoxaparin Injectable 30 milliGRAM(s) SubCutaneous every 12 hours  insulin lispro (HumaLOG) corrective regimen sliding scale   SubCutaneous three times a day before meals  magnesium sulfate  IVPB 2 Gram(s) IV Intermittent once  multivitamin 1 Tablet(s) Oral daily  nicotine -  14 mG/24Hr(s) Patch 1 patch Transdermal daily  piperacillin/tazobactam IVPB. 3.375 Gram(s) IV Intermittent every 8 hours  potassium chloride    Tablet ER 20 milliEquivalent(s) Oral every 2 hours  tamsulosin 0.4 milliGRAM(s) Oral at bedtime  thiamine 100 milliGRAM(s) Oral daily    MEDICATIONS  (PRN):  aluminum hydroxide/magnesium hydroxide/simethicone Suspension 30 milliLiter(s) Oral every 4 hours PRN Dyspepsia  LORazepam   Injectable 1 milliGRAM(s) IV Push every 2 hours PRN CIWA-Ar score increase by 2 points and a total score of 7 or less  morphine  - Injectable 2 milliGRAM(s) IV Push every 4 hours PRN Moderate Pain (4 - 6)      Vital Signs Last 24 Hrs  T(C): 36.9 (10 Mar 2019 08:41), Max: 36.9 (10 Mar 2019 08:41)  T(F): 98.5 (10 Mar 2019 08:41), Max: 98.5 (10 Mar 2019 08:41)  HR: 88 (10 Mar 2019 08:41) (88 - 92)  BP: 99/60 (10 Mar 2019 08:41) (95/52 - 129/78)  BP(mean): --  RR: 18 (10 Mar 2019 08:41) (16 - 18)  SpO2: 98% (10 Mar 2019 08:41) (95% - 98%)    PE:   gen: nad, a&ox3  cv: rrr  resp: nonlabored breathing  gi: soft, nd, nttp  msk: RLE with dressing in place. RUE midline in place.        I&O's Detail    09 Mar 2019 06:01  -  10 Mar 2019 07:00  --------------------------------------------------------  IN:    amiodarone Infusion: 83.5 mL    IV PiggyBack: 50 mL    multiple electrolytes Injection Type 1multiple electrolytes Injection Type 1: 225 mL    Solution: 100 mL    Solution: 100 mL  Total IN: 558.5 mL    OUT:    Indwelling Catheter - Urethral: 430 mL  Total OUT: 430 mL    Total NET: 128.5 mL      10 Mar 2019 07:01  -  10 Mar 2019 15:47  --------------------------------------------------------  IN:  Total IN: 0 mL    OUT:    Voided: 200 mL  Total OUT: 200 mL    Total NET: -200 mL          LABS:                        10.3   6.0   )-----------( 98       ( 10 Mar 2019 08:56 )             31.0     03-10    136  |  100  |  13.0  ----------------------------<  95  3.6   |  26.0  |  0.52    Ca    8.3<L>      10 Mar 2019 08:56  Phos  2.2     03-10  Mg     1.7     03-10    TPro  5.6<L>  /  Alb  2.7<L>  /  TBili  0.4  /  DBili  0.1  /  AST  23  /  ALT  14  /  AlkPhos  58  03-09          RADIOLOGY & ADDITIONAL STUDIES:

## 2019-03-10 NOTE — PROGRESS NOTE ADULT - SUBJECTIVE AND OBJECTIVE BOX
The patient is status post right hip CRPP, POD # 2. Pt also has L1 compression fx. Patient is doing well. The patient's pain is controlled using the prescribed pain medications. The patient is participating in physical therapy. He attempted to stand with PT today. Denies back pain. No numbness or tingling in extremities. No new complaints. No acute motor or sensory changes are reported.    Vital Signs Last 24 Hrs  T(C): 36.9 (10 Mar 2019 08:41), Max: 36.9 (10 Mar 2019 08:41)  T(F): 98.5 (10 Mar 2019 08:41), Max: 98.5 (10 Mar 2019 08:41)  HR: 88 (10 Mar 2019 08:41) (88 - 92)  BP: 99/60 (10 Mar 2019 08:41) (95/52 - 129/78)  BP(mean): --  RR: 18 (10 Mar 2019 08:41) (16 - 18)  SpO2: 98% (10 Mar 2019 08:41) (95% - 98%)    Physical exam: Lying in bed in NAD. The dressing is clean, dry and intact. Removed, mild irritation noted proximal to incision site. no active drainage, no erythema. incision site healing well. Sensation to light touch is grossly intact without focal deficit and is symmetric bilaterally. No calf tenderness. Sensation to light touch is grossly intact distally. Motor function distally is 5/5. No foot drop. +ehl/fhl. +df/pf. Able to straight leg raise BLE. 2+ dorsalis pedis pulse. Capillary refill is less than 2 seconds. No cyanosis.                          10.3   6.0   )-----------( 98       ( 10 Mar 2019 08:56 )             31.0     A/P: 73 y.o M s/p right hip perc pinning POD #2  - dressing changed  - WBAT  - DVTP  - D/W Dr. Medina

## 2019-03-10 NOTE — PROGRESS NOTE ADULT - SUBJECTIVE AND OBJECTIVE BOX
CHIEF COMPLAINT:Patient is a 73y old  Male who presents with a chief complaint of s/p found down (09 Mar 2019 14:21)    INTERVAL HISTORY:  pt with afib, in nsr  ETOH abuse. [place] not offer much complains.     Allergies  No Known Allergies  	  MEDICATIONS:  amiodarone    Tablet 200 milliGRAM(s) Oral daily  tamsulosin 0.4 milliGRAM(s) Oral at bedtime  piperacillin/tazobactam IVPB. 3.375 Gram(s) IV Intermittent every 8 hours  ALBUTerol/ipratropium for Nebulization 3 milliLiter(s) Nebulizer every 6 hours  LORazepam   Injectable 1 milliGRAM(s) IV Push every 2 hours PRN  morphine  - Injectable 2 milliGRAM(s) IV Push every 4 hours PRN  insulin lispro (HumaLOG) corrective regimen sliding scale   SubCutaneous three times a day before meals  chlorhexidine 2% Cloths 1 Application(s) Topical daily  enoxaparin Injectable 30 milliGRAM(s) SubCutaneous every 12 hours  folic acid Injectable 1 milliGRAM(s) IV Push daily  multivitamin 1 Tablet(s) Oral daily    PHYSICAL EXAM:  T(C): 36.9 (03-10-19 @ 08:41), Max: 36.9 (03-10-19 @ 08:41)  HR: 88 (03-10-19 @ 08:41) (76 - 92)  BP: 99/60 (03-10-19 @ 08:41) (95/52 - 129/78)  RR: 18 (03-10-19 @ 08:41) (14 - 21)  SpO2: 98% (03-10-19 @ 08:41) (94% - 98%)  I&O's Summary    09 Mar 2019 06:01  -  10 Mar 2019 07:00  --------------------------------------------------------  IN: 558.5 mL / OUT: 430 mL / NET: 128.5 mL  Appearance: Normal	  HEENT:   NC/AT  Eye: Pink Conjunctiva  Lungs: CTA B/L  CVS: RRR, Normal S1 and S2, No Edema    TELEMETRY: NSR.  	    ECG: NSR, Qtc 470 msec.       LABS:	                          10.3   6.0   )-----------( 98       ( 10 Mar 2019 08:56 )             31.0     03-10    136  |  100  |  13.0  ----------------------------<  95  3.6   |  26.0  |  0.52    Ca    8.3<L>      10 Mar 2019 08:56  Phos  2.2     03-10  Mg     1.7     03-10  TPro  5.6<L>  /  Alb  2.7<L>  /  TBili  0.4  /  DBili  0.1  /  AST  23  /  ALT  14  /  AlkPhos  58  03-09    ASSESSMENT/PLAN: 	  1. afib, nsr.  2. cont with amio 200 mg. qtc ok today  3. Surgical team has reservation with recent fall to start AC.   4. Cont with supportive care  5. Replace Mg.

## 2019-03-10 NOTE — PROVIDER CONTACT NOTE (OTHER) - ACTION/TREATMENT ORDERED:
as per MD. give more time for patient to void. and ok to use midline for IV abx. provider to RN order to be put in.
notified MD pt last bladder scan 366. pt voided 200 on his own.
Hold off as flomax takes 4-6hrs to work. If pt gets uncomfortable call back.
Will put in order for velazquez and flomax

## 2019-03-11 LAB
ANION GAP SERPL CALC-SCNC: 11 MMOL/L — SIGNIFICANT CHANGE UP (ref 5–17)
BUN SERPL-MCNC: 11 MG/DL — SIGNIFICANT CHANGE UP (ref 8–20)
CALCIUM SERPL-MCNC: 8.3 MG/DL — LOW (ref 8.6–10.2)
CHLORIDE SERPL-SCNC: 98 MMOL/L — SIGNIFICANT CHANGE UP (ref 98–107)
CO2 SERPL-SCNC: 26 MMOL/L — SIGNIFICANT CHANGE UP (ref 22–29)
CREAT SERPL-MCNC: 0.6 MG/DL — SIGNIFICANT CHANGE UP (ref 0.5–1.3)
CULTURE RESULTS: SIGNIFICANT CHANGE UP
CULTURE RESULTS: SIGNIFICANT CHANGE UP
GLUCOSE BLDC GLUCOMTR-MCNC: 101 MG/DL — HIGH (ref 70–99)
GLUCOSE BLDC GLUCOMTR-MCNC: 103 MG/DL — HIGH (ref 70–99)
GLUCOSE BLDC GLUCOMTR-MCNC: 124 MG/DL — HIGH (ref 70–99)
GLUCOSE BLDC GLUCOMTR-MCNC: 125 MG/DL — HIGH (ref 70–99)
GLUCOSE SERPL-MCNC: 132 MG/DL — HIGH (ref 70–115)
HCT VFR BLD CALC: 32.1 % — LOW (ref 42–52)
HGB BLD-MCNC: 10.7 G/DL — LOW (ref 14–18)
MAGNESIUM SERPL-MCNC: 2.1 MG/DL — SIGNIFICANT CHANGE UP (ref 1.6–2.6)
MCHC RBC-ENTMCNC: 31.7 PG — HIGH (ref 27–31)
MCHC RBC-ENTMCNC: 33.3 G/DL — SIGNIFICANT CHANGE UP (ref 32–36)
MCV RBC AUTO: 95 FL — HIGH (ref 80–94)
PHOSPHATE SERPL-MCNC: 3.1 MG/DL — SIGNIFICANT CHANGE UP (ref 2.4–4.7)
PLATELET # BLD AUTO: 131 K/UL — LOW (ref 150–400)
POTASSIUM SERPL-MCNC: 3.8 MMOL/L — SIGNIFICANT CHANGE UP (ref 3.5–5.3)
POTASSIUM SERPL-SCNC: 3.8 MMOL/L — SIGNIFICANT CHANGE UP (ref 3.5–5.3)
RBC # BLD: 3.38 M/UL — LOW (ref 4.6–6.2)
RBC # FLD: 16.1 % — HIGH (ref 11–15.6)
SODIUM SERPL-SCNC: 135 MMOL/L — SIGNIFICANT CHANGE UP (ref 135–145)
SPECIMEN SOURCE: SIGNIFICANT CHANGE UP
SPECIMEN SOURCE: SIGNIFICANT CHANGE UP
WBC # BLD: 5.8 K/UL — SIGNIFICANT CHANGE UP (ref 4.8–10.8)
WBC # FLD AUTO: 5.8 K/UL — SIGNIFICANT CHANGE UP (ref 4.8–10.8)

## 2019-03-11 PROCEDURE — 99232 SBSQ HOSP IP/OBS MODERATE 35: CPT

## 2019-03-11 PROCEDURE — 99223 1ST HOSP IP/OBS HIGH 75: CPT

## 2019-03-11 RX ORDER — OXYCODONE HYDROCHLORIDE 5 MG/1
1 TABLET ORAL
Qty: 0 | Refills: 0 | COMMUNITY
Start: 2019-03-11

## 2019-03-11 RX ORDER — AMIODARONE HYDROCHLORIDE 400 MG/1
1 TABLET ORAL
Qty: 0 | Refills: 0 | COMMUNITY
Start: 2019-03-11

## 2019-03-11 RX ORDER — OXYCODONE HYDROCHLORIDE 5 MG/1
5 TABLET ORAL EVERY 6 HOURS
Qty: 0 | Refills: 0 | Status: DISCONTINUED | OUTPATIENT
Start: 2019-03-11 | End: 2019-03-14

## 2019-03-11 RX ORDER — POTASSIUM CHLORIDE 20 MEQ
20 PACKET (EA) ORAL ONCE
Qty: 0 | Refills: 0 | Status: COMPLETED | OUTPATIENT
Start: 2019-03-11 | End: 2019-03-11

## 2019-03-11 RX ORDER — NICOTINE POLACRILEX 2 MG
1 GUM BUCCAL
Qty: 0 | Refills: 0 | COMMUNITY
Start: 2019-03-11

## 2019-03-11 RX ORDER — CEPHALEXIN 500 MG
500 CAPSULE ORAL
Qty: 0 | Refills: 0 | Status: DISCONTINUED | OUTPATIENT
Start: 2019-03-11 | End: 2019-03-14

## 2019-03-11 RX ORDER — ACETAMINOPHEN 500 MG
650 TABLET ORAL EVERY 6 HOURS
Qty: 0 | Refills: 0 | Status: DISCONTINUED | OUTPATIENT
Start: 2019-03-11 | End: 2019-03-14

## 2019-03-11 RX ORDER — ACETAMINOPHEN 500 MG
2 TABLET ORAL
Qty: 0 | Refills: 0 | COMMUNITY
Start: 2019-03-11

## 2019-03-11 RX ORDER — CEPHALEXIN 500 MG
1 CAPSULE ORAL
Qty: 0 | Refills: 0 | COMMUNITY
Start: 2019-03-11 | End: 2019-03-15

## 2019-03-11 RX ORDER — IPRATROPIUM/ALBUTEROL SULFATE 18-103MCG
3 AEROSOL WITH ADAPTER (GRAM) INHALATION
Qty: 0 | Refills: 0 | COMMUNITY
Start: 2019-03-11

## 2019-03-11 RX ORDER — THIAMINE MONONITRATE (VIT B1) 100 MG
1 TABLET ORAL
Qty: 0 | Refills: 0 | COMMUNITY
Start: 2019-03-11

## 2019-03-11 RX ORDER — TAMSULOSIN HYDROCHLORIDE 0.4 MG/1
1 CAPSULE ORAL
Qty: 0 | Refills: 0 | COMMUNITY
Start: 2019-03-11

## 2019-03-11 RX ORDER — IPRATROPIUM/ALBUTEROL SULFATE 18-103MCG
3 AEROSOL WITH ADAPTER (GRAM) INHALATION EVERY 4 HOURS
Qty: 0 | Refills: 0 | Status: DISCONTINUED | OUTPATIENT
Start: 2019-03-11 | End: 2019-03-14

## 2019-03-11 RX ADMIN — TAMSULOSIN HYDROCHLORIDE 0.4 MILLIGRAM(S): 0.4 CAPSULE ORAL at 22:41

## 2019-03-11 RX ADMIN — Medication 500 MILLIGRAM(S): at 23:16

## 2019-03-11 RX ADMIN — OXYCODONE HYDROCHLORIDE 5 MILLIGRAM(S): 5 TABLET ORAL at 23:50

## 2019-03-11 RX ADMIN — Medication 650 MILLIGRAM(S): at 17:55

## 2019-03-11 RX ADMIN — Medication 500 MILLIGRAM(S): at 12:49

## 2019-03-11 RX ADMIN — Medication 1 PATCH: at 12:52

## 2019-03-11 RX ADMIN — Medication 500 MILLIGRAM(S): at 16:53

## 2019-03-11 RX ADMIN — OXYCODONE HYDROCHLORIDE 5 MILLIGRAM(S): 5 TABLET ORAL at 22:41

## 2019-03-11 RX ADMIN — AMIODARONE HYDROCHLORIDE 200 MILLIGRAM(S): 400 TABLET ORAL at 05:55

## 2019-03-11 RX ADMIN — Medication 20 MILLIEQUIVALENT(S): at 10:07

## 2019-03-11 RX ADMIN — Medication 650 MILLIGRAM(S): at 13:01

## 2019-03-11 RX ADMIN — Medication 650 MILLIGRAM(S): at 23:45

## 2019-03-11 RX ADMIN — Medication 1 TABLET(S): at 12:48

## 2019-03-11 RX ADMIN — ENOXAPARIN SODIUM 30 MILLIGRAM(S): 100 INJECTION SUBCUTANEOUS at 16:52

## 2019-03-11 RX ADMIN — Medication 650 MILLIGRAM(S): at 17:53

## 2019-03-11 RX ADMIN — Medication 650 MILLIGRAM(S): at 23:16

## 2019-03-11 RX ADMIN — Medication 100 MILLIGRAM(S): at 12:49

## 2019-03-11 RX ADMIN — Medication 650 MILLIGRAM(S): at 14:01

## 2019-03-11 RX ADMIN — PIPERACILLIN AND TAZOBACTAM 25 GRAM(S): 4; .5 INJECTION, POWDER, LYOPHILIZED, FOR SOLUTION INTRAVENOUS at 05:55

## 2019-03-11 RX ADMIN — ENOXAPARIN SODIUM 30 MILLIGRAM(S): 100 INJECTION SUBCUTANEOUS at 05:55

## 2019-03-11 NOTE — PROGRESS NOTE ADULT - SUBJECTIVE AND OBJECTIVE BOX
Subjective:73y[facility]. ETOH abuse, s/p fall, hip fx, pyelonephritis, urinary retention.  Pt under went hip pinning last week, passed trial of void and is urinating well. Pt more alert and awake, answering questions well.  Pt states he has no sense of urgency or incomplete bladder emptying after voiding.  Pt remains afebrile.      Vital Signs Last 24 Hrs  T(C): 36.7 (11 Mar 2019 07:00), Max: 37 (10 Mar 2019 16:15)  T(F): 98.1 (11 Mar 2019 07:00), Max: 98.6 (10 Mar 2019 16:15)  HR: 92 (11 Mar 2019 07:00) (87 - 98)  BP: 106/71 (11 Mar 2019 07:00) (106/71 - 123/70)  BP(mean): --  RR: 19 (11 Mar 2019 07:00) (18 - 19)  SpO2: 97% (11 Mar 2019 07:00) (92% - 97%)  I&O's Detail    10 Mar 2019 07:01  -  11 Mar 2019 07:00  --------------------------------------------------------  IN:  Total IN: 0 mL    OUT:    Voided: 2075 mL  Total OUT: 2075 mL    Total NET: -2075 mL          Labs:                        10.7   5.8   )-----------( 131      ( 11 Mar 2019 07:33 )             32.1     03-11    135  |  98  |  11.0  ----------------------------<  132<H>  3.8   |  26.0  |  0.60    Ca    8.3<L>      11 Mar 2019 07:33  Phos  3.1     03-11  Mg     2.1     03-11    TPro  5.6<L>  /  Alb  2.7<L>  /  TBili  0.4  /  DBili  0.1  /  AST  23  /  ALT  14  /  AlkPhos  58  03-09

## 2019-03-11 NOTE — PROGRESS NOTE ADULT - SUBJECTIVE AND OBJECTIVE BOX
AXEL ARGUELLES    [id number]    History: 73y Male is status post R hip CRPP POD # 3. Patient is doing well and is comfortable. The patient's pain is controlled using the prescribed pain medications. Denies nausea, vomiting, chest pain, shortness of breath, abdominal pain or fever. No new complaints.                            10.7   5.8   )-----------( 131      ( 11 Mar 2019 07:33 )             32.1     03-11    135  |  98  |  11.0  ----------------------------<  132<H>  3.8   |  26.0  |  0.60    Ca    8.3<L>      11 Mar 2019 07:33  Phos  3.1     03-11  Mg     2.1     03-11    TPro  5.6<L>  /  Alb  2.7<L>  /  TBili  0.4  /  DBili  0.1  /  AST  23  /  ALT  14  /  AlkPhos  58  03-09      MEDICATIONS  (STANDING):  ALBUTerol/ipratropium for Nebulization 3 milliLiter(s) Nebulizer every 6 hours  amiodarone    Tablet 200 milliGRAM(s) Oral daily  enoxaparin Injectable 30 milliGRAM(s) SubCutaneous every 12 hours  insulin lispro (HumaLOG) corrective regimen sliding scale   SubCutaneous three times a day before meals  multivitamin 1 Tablet(s) Oral daily  nicotine -  14 mG/24Hr(s) Patch 1 patch Transdermal daily  piperacillin/tazobactam IVPB. 3.375 Gram(s) IV Intermittent every 8 hours  potassium chloride    Tablet ER 20 milliEquivalent(s) Oral once  tamsulosin 0.4 milliGRAM(s) Oral at bedtime  thiamine 100 milliGRAM(s) Oral daily    MEDICATIONS  (PRN):  aluminum hydroxide/magnesium hydroxide/simethicone Suspension 30 milliLiter(s) Oral every 4 hours PRN Dyspepsia  LORazepam   Injectable 1 milliGRAM(s) IV Push every 2 hours PRN CIWA-Ar score increase by 2 points and a total score of 7 or less  morphine  - Injectable 2 milliGRAM(s) IV Push every 4 hours PRN Moderate Pain (4 - 6)      Physical exam: The dressing is clean, dry and intact. No drainage or discharge. No erythema is noted. No blistering. No ecchymosis. The calf is supple nontender. Sensation to light touch is grossly intact distally. Extensor hallucis longus and flexor hallucis longus are intact. No foot drop. 2+ dorsalis pedis pulse. Capillary refill is less than 2 seconds. No cyanosis.    Primary Orthopedic Assessment:  • 73y Male is status post R hip CRPP POD # 3    Secondary  Orthopedic Assessment(s):   •     Secondary  Medical Assessment(s):   •     Plan:   • Pain control as clinically indicated  • DVT prophylaxis as prescribed, including use of compression devices and ankle pumps  • Continue physical therapy  • Weightbearing as tolerated of the right lower extremity with assistance of a walker  • Incentive spirometry encouraged  • Discharge planning as per primary team AXEL ARGUELLES    [id number]    History: 73y Male is status post R hip CRPP POD # 3, L1 compression fx. Patient is doing well and is comfortable. The patient's pain is controlled using the prescribed pain medications. Denies nausea, vomiting, chest pain, shortness of breath, abdominal pain or fever. No new complaints.                            10.7   5.8   )-----------( 131      ( 11 Mar 2019 07:33 )             32.1     03-11    135  |  98  |  11.0  ----------------------------<  132<H>  3.8   |  26.0  |  0.60    Ca    8.3<L>      11 Mar 2019 07:33  Phos  3.1     03-11  Mg     2.1     03-11    TPro  5.6<L>  /  Alb  2.7<L>  /  TBili  0.4  /  DBili  0.1  /  AST  23  /  ALT  14  /  AlkPhos  58  03-09      MEDICATIONS  (STANDING):  ALBUTerol/ipratropium for Nebulization 3 milliLiter(s) Nebulizer every 6 hours  amiodarone    Tablet 200 milliGRAM(s) Oral daily  enoxaparin Injectable 30 milliGRAM(s) SubCutaneous every 12 hours  insulin lispro (HumaLOG) corrective regimen sliding scale   SubCutaneous three times a day before meals  multivitamin 1 Tablet(s) Oral daily  nicotine -  14 mG/24Hr(s) Patch 1 patch Transdermal daily  piperacillin/tazobactam IVPB. 3.375 Gram(s) IV Intermittent every 8 hours  potassium chloride    Tablet ER 20 milliEquivalent(s) Oral once  tamsulosin 0.4 milliGRAM(s) Oral at bedtime  thiamine 100 milliGRAM(s) Oral daily    MEDICATIONS  (PRN):  aluminum hydroxide/magnesium hydroxide/simethicone Suspension 30 milliLiter(s) Oral every 4 hours PRN Dyspepsia  LORazepam   Injectable 1 milliGRAM(s) IV Push every 2 hours PRN CIWA-Ar score increase by 2 points and a total score of 7 or less  morphine  - Injectable 2 milliGRAM(s) IV Push every 4 hours PRN Moderate Pain (4 - 6)      Physical exam: RLE: The dressing is clean, dry and intact. No drainage or discharge. No erythema is noted. No blistering. No ecchymosis. The calf is supple nontender. Sensation to light touch is grossly intact distally. Extensor hallucis longus and flexor hallucis longus are intact. No foot drop. 2+ dorsalis pedis pulse. Capillary refill is less than 2 seconds. No cyanosis.    Lumbar: mild tenderness to palpation upper lumbar paraspinal muscles.    Primary Orthopedic Assessment:  • 73y Male is status post R hip CRPP POD # 3, L1 compression fx    Secondary  Orthopedic Assessment(s):   •     Secondary  Medical Assessment(s):   •     Plan:   • Pain control as clinically indicated  • DVT prophylaxis as prescribed, including use of compression devices and ankle pumps  • Continue physical therapy  • Weightbearing as tolerated of the right lower extremity with assistance of a walker  • Incentive spirometry encouraged  • f/u L-spine MRI  • Discharge planning as per primary team

## 2019-03-11 NOTE — CONSULT NOTE ADULT - SUBJECTIVE AND OBJECTIVE BOX
73yM was admitted on 03-06 after a trauma, found down covered in feces. At [facility] he was found to have a right femoral fracture and new onset AFIB + RVR. He was started on Amiodarone and placed on CIWA for his history of EtOH. In ED, GCS=13. He was transferred to the SICU.     Imaging showed (reviewed):  HEAD CT - No acute findings  CAP CT - No acute findings  C SPINE CT - No acute findings  L SPINE XR - Compression deformity at L1 of uncertain age. Angulation of the superior endplate of T12, also possible compression fracture of uncertain age. CT scan of the lumbar spine could be obtained for further evaluation of these findings.    Neurosurgery recommended TLSO.  Course complicated by renal pelvic stone and pyelonephritis and started on Zoyn.   He is s/p right IMN by Dr. Medina on 3/8.     Patient states he is not well. He is frustrated about his injury, he is having pain in the right leg. Patient is anxious and is unable to move as well. He performed "terribly" with therapy.    REVIEW OF SYSTEMS  Constitutional - No fever, No weight loss, +fatigue  HEENT - No eye pain, No visual disturbances, No difficulty hearing, No tinnitus, No vertigo, No neck pain  Respiratory - No cough, No wheezing, No shortness of breath  Cardiovascular - No chest pain, No palpitations  Gastrointestinal - No abdominal pain, No nausea, No vomiting, No diarrhea, No constipation  Genitourinary - No dysuria, No frequency, No hematuria, No incontinence  Neurological - No headaches, No memory loss, +loss of strength, No numbness, No tremors  Skin - No itching, No rashes, +lesions   Endocrine - No temperature intolerance  Musculoskeletal - +joint pain, +joint swelling, +muscle pain  Psychiatric - No depression, +anxiety    VITALS  T(C): 36.7 (03-11-19 @ 07:00), Max: 37 (03-10-19 @ 16:15)  HR: 92 (03-11-19 @ 07:00) (87 - 98)  BP: 106/71 (03-11-19 @ 07:00) (106/71 - 123/70)  RR: 19 (03-11-19 @ 07:00) (18 - 19)  SpO2: 97% (03-11-19 @ 07:00) (92% - 97%)  Wt(kg): --    PAST MEDICAL & SURGICAL HISTORY  Depression  Alcohol abuse with intoxication  H/O eye surgery      SOCIAL HISTORY  Smoking - ++ 90PPY  EtOH - +  Drugs - Denied    FUNCTIONAL HISTORY  Lives alone, 2 DINA  Independent    CURRENT FUNCTIONAL STATUS  3/9  Bed Mobility: Sit to Supine:     · Level of [place]	moderate assist (50% patients effort)	  · Physical Assist/Nonphysical Assist	2 person assist; verbal cues	    Bed Mobility: Supine to Sit:     · Level of [place]	moderate assist (50% patients effort)	  · Physical Assist/Nonphysical Assist	2 person assist; verbal cues	    Bed Mobility Analysis:     · Bed Mobility Limitations	decreased ability to use legs for bridging/pushing	  · Impairments Contributing to Impaired Bed Mobility	decreased strength; pain; decreased ROM	    Transfer: Sit to Stand:     · Level of [place]	moderate assist (50% patients effort)	  · Physical Assist/Nonphysical Assist	2 person assist; verbal cues	  · Weight-Bearing Restrictions	partial weight-bearing; LSO	  · Assistive Device	rolling walker	    Transfer: Stand to Sit:     · Level of [place]	moderate assist (50% patients effort); LSO	  · Physical Assist/Nonphysical Assist	2 person assist; verbal cues	  · Weight-Bearing Restrictions	partial weight-bearing	  · Assistive Device	rolling walker	    Sit/Stand Transfer Safety Analysis:     · Impairments Contributing to Impaired Transfers	decreased strength; decreased ROM; pain	    Gait Skills:     · Level of [place]	moderate assist (50% patients effort)	  · Physical Assist/Nonphysical Assist	2 person assist; verbal cues	  · Weight-Bearing Restrictions	partial weight-bearing	  · Assistive Device	rolling walker	  · Gait Distance	5 feet	  · Brace/Orthotics	LSO	      FAMILY HISTORY   No pertinent family history in first degree relatives      RECENT LABS/IMAGING  CBC Full  -  ( 11 Mar 2019 07:33 )  WBC Count : 5.8 K/uL  Hemoglobin : 10.7 g/dL  Hematocrit : 32.1 %  Platelet Count - Automated : 131 K/uL  Mean Cell Volume : 95.0 fl  Mean Cell Hemoglobin : 31.7 pg  Mean Cell Hemoglobin Concentration : 33.3 g/dL  Auto Neutrophil # : x  Auto Lymphocyte # : x  Auto Monocyte # : x  Auto Eosinophil # : x  Auto Basophil # : x  Auto Neutrophil % : x  Auto Lymphocyte % : x  Auto Monocyte % : x  Auto Eosinophil % : x  Auto Basophil % : x    03-11    135  |  98  |  11.0  ----------------------------<  132<H>  3.8   |  26.0  |  0.60    Ca    8.3<L>      11 Mar 2019 07:33  Phos  3.1     03-11  Mg     2.1     03-11    TPro  5.6<L>  /  Alb  2.7<L>  /  TBili  0.4  /  DBili  0.1  /  AST  23  /  ALT  14  /  AlkPhos  58  03-09        ALLERGIES  No Known Allergies      MEDICATIONS   ALBUTerol/ipratropium for Nebulization 3 milliLiter(s) Nebulizer every 6 hours  aluminum hydroxide/magnesium hydroxide/simethicone Suspension 30 milliLiter(s) Oral every 4 hours PRN  amiodarone    Tablet 200 milliGRAM(s) Oral daily  enoxaparin Injectable 30 milliGRAM(s) SubCutaneous every 12 hours  insulin lispro (HumaLOG) corrective regimen sliding scale   SubCutaneous three times a day before meals  LORazepam   Injectable 1 milliGRAM(s) IV Push every 2 hours PRN  morphine  - Injectable 2 milliGRAM(s) IV Push every 4 hours PRN  multivitamin 1 Tablet(s) Oral daily  nicotine -  14 mG/24Hr(s) Patch 1 patch Transdermal daily  piperacillin/tazobactam IVPB. 3.375 Gram(s) IV Intermittent every 8 hours  potassium chloride    Tablet ER 20 milliEquivalent(s) Oral once  tamsulosin 0.4 milliGRAM(s) Oral at bedtime  thiamine 100 milliGRAM(s) Oral daily      ----------------------------------------------------------------------------------------  PHYSICAL EXAM  Constitutional - NAD, Uncomfortable  HEENT - NCAT, EOMI  Neck - Supple, No limited ROM  Chest - Breathing comfortably, No wheezing  Cardiovascular - S1S2   Abdomen - Soft   Extremities - Swelling of the right lateral thigh, bandages, some erythema, no discharge, No calf tenderness   Neurologic Exam -                    Cognitive - Awake, Alert, AAO to self, place, date, year, situation     Communication - Fluent, Mild dysarthria     Motor - right LE weakness - despite pain, dows well                    LEFT    UE - ShAB 5/5, EF 5/5, EE 5/5, WE 5/5,  5/5                    RIGHT UE - ShAB 5/5, EF 5/5, EE 5/5, WE 5/5,  5/5                    LEFT    LE - HF 5/5, KE 5/5, DF 5/5, PF 5/5                    RIGHT LE - HF 3/5, KE 4/5, DF 5/5, PF 5/5        Sensory - Intact to LT  Psychiatric - Anxious, Affect Flat  ----------------------------------------------------------------------------------------  ASSESSMENT/PLAN  73yMale with functional deficits after a trauma sustaining a right femoral fracture s/p IMN  Right femoral fracture - PWB  EtOH - Ativan, MVI, Thiamine, Add Folate  Pain - Tylenol, Morphine  New AFIB - Amiodarone  New Pyelonephritis - Zosyn  DVT PPX - SCDs, Lovenox  Rehab - Will continue to follow for ongoing rehab needs and recommendations.  Recommend ACUTE inpatient rehabilitation for the functional deficits consisting of 3 hours of therapy/day & 24 hour RN/daily PMR physician for comorbid medical management. Patient will be able to tolerate 3 hours a day.    Continue bedside therapy as well as OOB throughout the day with mobilization by staff to maintain cardiopulmonary function and prevention of secondary complications related to debility.

## 2019-03-11 NOTE — PROGRESS NOTE ADULT - SUBJECTIVE AND OBJECTIVE BOX
D/w Dr. Bowman - Patient is not to weight bear lower extremities until lumbar MRI done and evaluated/cleared by ortho spine. Order placed and discussed with OT this morning.

## 2019-03-11 NOTE — CHART NOTE - NSCHARTNOTEFT_GEN_A_CORE
Source: Patient     Current Diet: Regular    Patient reports appetite improving- ate everything on breakfast tray today. Would like to try health shakes     PO intake:  >75%    Current Weight:   (3/11) 58kg  (3/6) 61kg    % Weight Change     Pertinent Medications: MEDICATIONS  (STANDING):  ALBUTerol/ipratropium for Nebulization 3 milliLiter(s) Nebulizer every 6 hours  amiodarone    Tablet 200 milliGRAM(s) Oral daily  cephalexin 500 milliGRAM(s) Oral four times a day  enoxaparin Injectable 30 milliGRAM(s) SubCutaneous every 12 hours  insulin lispro (HumaLOG) corrective regimen sliding scale   SubCutaneous three times a day before meals  multivitamin 1 Tablet(s) Oral daily  nicotine -  14 mG/24Hr(s) Patch 1 patch Transdermal daily  tamsulosin 0.4 milliGRAM(s) Oral at bedtime  thiamine 100 milliGRAM(s) Oral daily    MEDICATIONS  (PRN):  aluminum hydroxide/magnesium hydroxide/simethicone Suspension 30 milliLiter(s) Oral every 4 hours PRN Dyspepsia  morphine  - Injectable 2 milliGRAM(s) IV Push every 4 hours PRN Moderate Pain (4 - 6)    Pertinent Labs: CBC Full  -  ( 11 Mar 2019 07:33 )  WBC Count : 5.8 K/uL  Hemoglobin : 10.7 g/dL  Hematocrit : 32.1 %  Platelet Count - Automated : 131 K/uL  Mean Cell Volume : 95.0 fl  Mean Cell Hemoglobin : 31.7 pg  Mean Cell Hemoglobin Concentration : 33.3 g/dL  Glu 132      Skin: intact     Nutrition focused physical exam previously conducted - found signs of malnutrition [ ]absent [ x]present    Subcutaneous fat loss: [ ] Orbital fat pads region, [x ]Buccal fat region, [ ]Triceps region,  [x ]Ribs region    Muscle wasting: [ x]Temples region, [ x]Clavicle region, [ ]Shoulder region, [ ]Scapula region, [ ]Interosseous region,  [ ]thigh region, [ ]Calf region    Estimated Needs:   [x ] no change since previous assessment  [ ] recalculated:     Current Nutrition Diagnosis:·  Malnutrition; Severe (acute) related to inadequate protein-energy intake in the setting of ETOH abuse and pyelonephritis as evidenced by pt meeting < 50% est. nut. needs > 5 days, severe muscle/fat wasting, + wt loss.      Recommendations: Continue current diet -- provided health shakes TID     Monitoring and Evaluation:   [ x] PO intake [x ] Tolerance to diet prescription [X] Weights  [X] Follow up per protocol [X] Labs:

## 2019-03-11 NOTE — PROGRESS NOTE ADULT - SUBJECTIVE AND OBJECTIVE BOX
CARDIOLOGY PROGRESS NOTE   ([facility])                                                                                                        Subjective: aler, awake, laying in bed, nad, denied CP, dyspnea    Vitals:  T(C): 36.7 (03-10-19 @ 23:12), Max: 37 (03-10-19 @ 16:15)  HR: 97 (03-11-19 @ 05:52) (87 - 98)  BP: 121/79 (03-11-19 @ 05:52) (109/68 - 123/70)  RR: 18 (03-10-19 @ 23:12) (18 - 18)  SpO2: 94% (03-10-19 @ 23:12) (92% - 94%)  Wt(kg): --  I&O's Summary    10 Mar 2019 07:01  -  11 Mar 2019 07:00  --------------------------------------------------------  IN: 0 mL / OUT: 2075 mL / NET: -2075 mL          PHYSICAL EXAM:  Appearance: Normal	  HEENT:   Atraumatic  Cardiovascular: Normal S1 S2, No JVD, No murmurs, No edema  Respiratory: Lungs clear to auscultation	  Gastrointestinal:  Soft, Non-tender, + BS	  Skin: No rashes, No ecchymoses, No cyanosis  Neurologic: Alert and awake, able to move extremities  Extremities: No edema    TELEMETRY: 	    ECG:  	SR          CURRENT MEDICATIONS:  amiodarone    Tablet 200 milliGRAM(s) Oral daily  tamsulosin 0.4 milliGRAM(s) Oral at bedtime    piperacillin/tazobactam IVPB. 3.375 Gram(s) IV Intermittent every 8 hours    ALBUTerol/ipratropium for Nebulization 3 milliLiter(s) Nebulizer every 6 hours    LORazepam   Injectable 1 milliGRAM(s) IV Push every 2 hours PRN  morphine  - Injectable 2 milliGRAM(s) IV Push every 4 hours PRN    aluminum hydroxide/magnesium hydroxide/simethicone Suspension 30 milliLiter(s) Oral every 4 hours PRN    insulin lispro (HumaLOG) corrective regimen sliding scale   SubCutaneous three times a day before meals    enoxaparin Injectable 30 milliGRAM(s) SubCutaneous every 12 hours  multivitamin 1 Tablet(s) Oral daily  potassium chloride    Tablet ER 20 milliEquivalent(s) Oral once  thiamine 100 milliGRAM(s) Oral daily      LABS:	 	    EKG 3/10    Ventricular Rate 92 BPM    Atrial Rate 92 BPM    P-R Interval 178 ms    QRS Duration 78 ms    Q-T Interval 386 ms    QTC Calculation(Bezet) 477 ms    P Axis 72 degrees    R Axis -46 degrees    T Axis 62 degrees    Diagnosis Line Normal sinus rhythm  Left anterior fascicular block  Abnormal ECG    Confirmed by EFRAIN SWAN ([id number]) on 3/11/2019 8:04:26 AM                          10.7   5.8   )-----------( 131      ( 11 Mar 2019 07:33 )             32.1     03-11    135  |  98  |  11.0  ----------------------------<  132<H>  3.8   |  26.0  |  0.60    Ca    8.3<L>      11 Mar 2019 07:33  Phos  3.1     03-11  Mg     2.1     03-11    TPro  5.6<L>  /  Alb  2.7<L>  /  TBili  0.4  /  DBili  0.1  /  AST  23  /  ALT  14  /  AlkPhos  58  03-09

## 2019-03-11 NOTE — PROGRESS NOTE ADULT - SUBJECTIVE AND OBJECTIVE BOX
INTERVAL HPI/OVERNIGHT EVENTS: Patient w/calculated CHADSVASC 1    SUBJECTIVE: Feeling well this AM. No fevers/chills, no N/V.       MEDICATIONS  (STANDING):  ALBUTerol/ipratropium for Nebulization 3 milliLiter(s) Nebulizer every 6 hours  amiodarone    Tablet 200 milliGRAM(s) Oral daily  chlorhexidine 2% Cloths 1 Application(s) Topical daily  enoxaparin Injectable 30 milliGRAM(s) SubCutaneous every 12 hours  insulin lispro (HumaLOG) corrective regimen sliding scale   SubCutaneous three times a day before meals  multivitamin 1 Tablet(s) Oral daily  nicotine -  14 mG/24Hr(s) Patch 1 patch Transdermal daily  piperacillin/tazobactam IVPB. 3.375 Gram(s) IV Intermittent every 8 hours  tamsulosin 0.4 milliGRAM(s) Oral at bedtime  thiamine 100 milliGRAM(s) Oral daily    MEDICATIONS  (PRN):  aluminum hydroxide/magnesium hydroxide/simethicone Suspension 30 milliLiter(s) Oral every 4 hours PRN Dyspepsia  LORazepam   Injectable 1 milliGRAM(s) IV Push every 2 hours PRN CIWA-Ar score increase by 2 points and a total score of 7 or less  morphine  - Injectable 2 milliGRAM(s) IV Push every 4 hours PRN Moderate Pain (4 - 6)      Vital Signs Last 24 Hrs  T(C): 36.7 (10 Mar 2019 23:12), Max: 37 (10 Mar 2019 16:15)  T(F): 98.1 (10 Mar 2019 23:12), Max: 98.6 (10 Mar 2019 16:15)  HR: 97 (11 Mar 2019 05:52) (87 - 98)  BP: 121/79 (11 Mar 2019 05:52) (99/60 - 123/70)  BP(mean): --  RR: 18 (10 Mar 2019 23:12) (18 - 18)  SpO2: 94% (10 Mar 2019 23:12) (92% - 98%)    PE:   gen: nad, a&ox3  cv: rrr  resp: nonlabored breathing  gi: soft, nd, nttp  msk: RLE with dressing in place. RUE midline in place.      I&O's Detail    09 Mar 2019 06:01  -  10 Mar 2019 07:00  --------------------------------------------------------  IN:    amiodarone Infusion: 83.5 mL    IV PiggyBack: 50 mL    multiple electrolytes Injection Type 1multiple electrolytes Injection Type 1: 225 mL    Solution: 100 mL    Solution: 100 mL  Total IN: 558.5 mL    OUT:    Indwelling Catheter - Urethral: 430 mL  Total OUT: 430 mL    Total NET: 128.5 mL      10 Mar 2019 07:01  -  11 Mar 2019 06:20  --------------------------------------------------------  IN:  Total IN: 0 mL    OUT:    Voided: 825 mL  Total OUT: 825 mL    Total NET: -825 mL          LABS:                        10.3   6.0   )-----------( 98       ( 10 Mar 2019 08:56 )             31.0     03-10    136  |  100  |  13.0  ----------------------------<  95  3.6   |  26.0  |  0.52    Ca    8.3<L>      10 Mar 2019 08:56  Phos  2.2     03-10  Mg     1.7     03-10    TPro  5.6<L>  /  Alb  2.7<L>  /  TBili  0.4  /  DBili  0.1  /  AST  23  /  ALT  14  /  AlkPhos  58  03-09          RADIOLOGY & ADDITIONAL STUDIES:

## 2019-03-12 LAB
GLUCOSE BLDC GLUCOMTR-MCNC: 110 MG/DL — HIGH (ref 70–99)
GLUCOSE BLDC GLUCOMTR-MCNC: 127 MG/DL — HIGH (ref 70–99)

## 2019-03-12 PROCEDURE — 99233 SBSQ HOSP IP/OBS HIGH 50: CPT

## 2019-03-12 PROCEDURE — 72148 MRI LUMBAR SPINE W/O DYE: CPT | Mod: 26

## 2019-03-12 RX ADMIN — Medication 1 TABLET(S): at 11:17

## 2019-03-12 RX ADMIN — Medication 500 MILLIGRAM(S): at 05:56

## 2019-03-12 RX ADMIN — OXYCODONE HYDROCHLORIDE 5 MILLIGRAM(S): 5 TABLET ORAL at 12:54

## 2019-03-12 RX ADMIN — Medication 650 MILLIGRAM(S): at 06:43

## 2019-03-12 RX ADMIN — ENOXAPARIN SODIUM 30 MILLIGRAM(S): 100 INJECTION SUBCUTANEOUS at 05:56

## 2019-03-12 RX ADMIN — Medication 100 MILLIGRAM(S): at 11:18

## 2019-03-12 RX ADMIN — Medication 650 MILLIGRAM(S): at 18:32

## 2019-03-12 RX ADMIN — OXYCODONE HYDROCHLORIDE 5 MILLIGRAM(S): 5 TABLET ORAL at 21:24

## 2019-03-12 RX ADMIN — Medication 500 MILLIGRAM(S): at 11:17

## 2019-03-12 RX ADMIN — Medication 500 MILLIGRAM(S): at 23:19

## 2019-03-12 RX ADMIN — AMIODARONE HYDROCHLORIDE 200 MILLIGRAM(S): 400 TABLET ORAL at 05:56

## 2019-03-12 RX ADMIN — OXYCODONE HYDROCHLORIDE 5 MILLIGRAM(S): 5 TABLET ORAL at 06:43

## 2019-03-12 RX ADMIN — Medication 650 MILLIGRAM(S): at 17:40

## 2019-03-12 RX ADMIN — Medication 650 MILLIGRAM(S): at 13:21

## 2019-03-12 RX ADMIN — Medication 650 MILLIGRAM(S): at 05:56

## 2019-03-12 RX ADMIN — TAMSULOSIN HYDROCHLORIDE 0.4 MILLIGRAM(S): 0.4 CAPSULE ORAL at 21:24

## 2019-03-12 RX ADMIN — Medication 500 MILLIGRAM(S): at 17:40

## 2019-03-12 RX ADMIN — OXYCODONE HYDROCHLORIDE 5 MILLIGRAM(S): 5 TABLET ORAL at 23:20

## 2019-03-12 RX ADMIN — ENOXAPARIN SODIUM 30 MILLIGRAM(S): 100 INJECTION SUBCUTANEOUS at 17:43

## 2019-03-12 RX ADMIN — Medication 650 MILLIGRAM(S): at 11:16

## 2019-03-12 RX ADMIN — OXYCODONE HYDROCHLORIDE 5 MILLIGRAM(S): 5 TABLET ORAL at 00:00

## 2019-03-12 RX ADMIN — Medication 650 MILLIGRAM(S): at 23:19

## 2019-03-12 RX ADMIN — OXYCODONE HYDROCHLORIDE 5 MILLIGRAM(S): 5 TABLET ORAL at 05:57

## 2019-03-12 RX ADMIN — Medication 1 PATCH: at 11:17

## 2019-03-12 NOTE — OCCUPATIONAL THERAPY INITIAL EVALUATION ADULT - PLANNED THERAPY INTERVENTIONS, OT EVAL
balance training/transfer training/neuromuscular re-education/ADL retraining/bed mobility training/strengthening

## 2019-03-12 NOTE — OCCUPATIONAL THERAPY INITIAL EVALUATION ADULT - GROOMING, PREVIOUS LEVEL OF FUNCTION, OT EVAL
Report given to [facility]. Patient transported in wheelchair with ERT. Transport from [facility] called to meet in [place].    independent

## 2019-03-12 NOTE — PROGRESS NOTE ADULT - SUBJECTIVE AND OBJECTIVE BOX
Pt Name: AXEL ARGUELLES    MRN: [id number]      History: 73y Male is status post R hip CRPP POD #4, L1 compression fx. Patient is doing well and is comfortable. The patient's pain is controlled using the prescribed pain medications. Denies nausea, vomiting, chest pain, shortness of breath, abdominal pain or fever. No new complaints.  He is having difficulties progressing with physical therapy      PAST MEDICAL & SURGICAL HISTORY:  PAST MEDICAL & SURGICAL HISTORY:  Depression  Alcohol abuse with intoxication  H/O eye surgery      Allergies: No Known Allergies  Send vanilla health shakes TID - RD OK (Unknown)      Ambulation: With Walker [x]                          10.7   5.8   )-----------( 131      ( 11 Mar 2019 07:33 )             32.1     03-11    135  |  98  |  11.0  ----------------------------<  132<H>  3.8   |  26.0  |  0.60    Ca    8.3<L>      11 Mar 2019 07:33  Phos  3.1     03-11  Mg     2.1     03-11        PHYSICAL EXAM:    Vital Signs Last 24 Hrs  T(C): 36.6 (12 Mar 2019 00:00), Max: 37.1 (11 Mar 2019 16:00)  T(F): 97.9 (12 Mar 2019 00:00), Max: 98.8 (11 Mar 2019 16:00)  HR: 62 (12 Mar 2019 05:00) (62 - 87)  BP: 108/65 (12 Mar 2019 05:00) (108/65 - 118/75)  BP(mean): --  RR: 20 (12 Mar 2019 00:00) (19 - 20)  SpO2: 96% (12 Mar 2019 00:00) (93% - 96%)  Daily     Daily     Appearance: Alert, responsive, in no acute distress.  Physical exam: RLE: The dressing is clean, dry and intact. No drainage or discharge. Mild erythema noted to proximal incision site - no signs of infection. No blistering. No ecchymosis. The calf is supple nontender. Sensation to light touch is grossly intact distally. Extensor hallucis longus and flexor hallucis longus are intact. No foot drop. 2+ dorsalis pedis pulse. Capillary refill is less than 2 seconds. No cyanosis.  Lumbar: mild tenderness to palpation upper lumbar paraspinal muscles.        A/P:  73y Male is status post R hip CRPP POD # 4, L1 compression fx        Plan:   • Pain control as clinically indicated  • DVT prophylaxis as prescribed, including use of compression devices and ankle pumps  • Continue physical therapy  • Incentive spirometry encouraged  • f/u L-spine MRI  • As per Dr. Bowman - Patient is not to weight bear lower extremities until lumbar MRI done and evaluated/cleared by ortho spine  • Discharge planning as per primary team

## 2019-03-12 NOTE — PROGRESS NOTE ADULT - SUBJECTIVE AND OBJECTIVE BOX
Patient in bed, complaining that he would like to go home, but no one has helped him do so.  Patient was able to sup to sit and back to supine with mod I.  He reports that his pain is controlled.   Communicated to team about getting patient seen but was on hold due to MRI.    Patient had an MRI of L spine (reviewed):  Incomplete study. Age-indeterminate L1, possibly subacute, superior endplate compression fracture. Inversion recovery sequence is recommended. Consider anesthesia sedation.    Patient is waiting for ortho spine clearance for WB.    FUNCTIONAL PROGRESS  3/9  Bed Mobility: Sit to Supine:     · Level of [place]	moderate assist (50% patients effort)	  · Physical Assist/Nonphysical Assist	2 person assist; verbal cues	    Bed Mobility: Supine to Sit:     · Level of [place]	moderate assist (50% patients effort)	  · Physical Assist/Nonphysical Assist	2 person assist; verbal cues	    Bed Mobility Analysis:     · Bed Mobility Limitations	decreased ability to use legs for bridging/pushing	  · Impairments Contributing to Impaired Bed Mobility	decreased strength; pain; decreased ROM	    Transfer: Sit to Stand:     · Level of [place]	moderate assist (50% patients effort)	  · Physical Assist/Nonphysical Assist	2 person assist; verbal cues	  · Weight-Bearing Restrictions	partial weight-bearing; LSO	  · Assistive Device	rolling walker	    Transfer: Stand to Sit:     · Level of [place]	moderate assist (50% patients effort); LSO	  · Physical Assist/Nonphysical Assist	2 person assist; verbal cues	  · Weight-Bearing Restrictions	partial weight-bearing	  · Assistive Device	rolling walker	    Sit/Stand Transfer Safety Analysis:     · Impairments Contributing to Impaired Transfers	decreased strength; decreased ROM; pain	    Gait Skills:     · Level of [place]	moderate assist (50% patients effort)	  · Physical Assist/Nonphysical Assist	2 person assist; verbal cues	  · Weight-Bearing Restrictions	partial weight-bearing	  · Assistive Device	rolling walker	  · Gait Distance	5 feet	  · Brace/Orthotics	LSO	        REVIEW OF SYSTEMS  Constitutional - No fever,  +fatigue  Neurological - No headaches, No memory loss, =loss of strength, No numbness, No tremors  Skin - No rashes, +lesions   Musculoskeletal - +joint pain, +muscle pain  Psychiatric - +depression, +anxiety    VITALS  T(C): 36.1 (03-12-19 @ 07:00), Max: 37.1 (03-11-19 @ 16:00)  HR: 96 (03-12-19 @ 07:00) (62 - 96)  BP: 95/71 (03-12-19 @ 07:00) (95/71 - 118/75)  RR: 16 (03-12-19 @ 07:00) (16 - 20)  SpO2: 96% (03-12-19 @ 07:00) (93% - 96%)  Wt(kg): --    MEDICATIONS   acetaminophen   Tablet .. 650 milliGRAM(s) every 6 hours  ALBUTerol/ipratropium for Nebulization 3 milliLiter(s) every 4 hours PRN  aluminum hydroxide/magnesium hydroxide/simethicone Suspension 30 milliLiter(s) every 4 hours PRN  amiodarone    Tablet 200 milliGRAM(s) daily  cephalexin 500 milliGRAM(s) four times a day  enoxaparin Injectable 30 milliGRAM(s) every 12 hours  insulin lispro (HumaLOG) corrective regimen sliding scale   three times a day before meals  multivitamin 1 Tablet(s) daily  nicotine -  14 mG/24Hr(s) Patch 1 patch daily  oxyCODONE    IR 5 milliGRAM(s) every 6 hours PRN  tamsulosin 0.4 milliGRAM(s) at bedtime  thiamine 100 milliGRAM(s) daily      RECENT LABS - Reviewed                        10.7   5.8   )-----------( 131      ( 11 Mar 2019 07:33 )             32.1     03-11    135  |  98  |  11.0  ----------------------------<  132<H>  3.8   |  26.0  |  0.60    Ca    8.3<L>      11 Mar 2019 07:33  Phos  3.1     03-11  Mg     2.1     03-11              ----------------------------------------------------------------------------------------  PHYSICAL EXAM  Constitutional - NAD, Comfortable  Extremities - Swelling of the right lateral thigh, bandages, some erythema, no discharge, No calf tenderness   Neurologic Exam -                    Motor - right LE weakness - limited by pain                    LEFT    LE - HF 5/5, KE 5/5, DF 5/5, PF 5/5                    RIGHT LE - HF 3/5, KE 4/5, DF 5/5, PF 5/5        Sensory - Intact to LT  Psychiatric - Anxious/frustrated, Affect Flat  ----------------------------------------------------------------------------------------  ASSESSMENT/PLAN  73yMale with functional deficits after a trauma sustaining a right femoral fracture s/p IMN and L1 compression fracture  Right femoral fracture - PWB  EtOH - MVI, Thiamine, Add Folate  Pain - Tylenol, Oxycodone  New AFIB - Amiodarone  New Pyelonephritis - Keflex  L1 compression fracture - MRI completed, awaiting ortho spine to make recommendations  DVT PPX - SCDs, Lovenox  Rehab - Will continue to follow for ongoing rehab needs and recommendations.  Progress will determine rehab dispo recommendations. Continue bedside therapy as well as OOB throughout the day with mobilization by staff to maintain cardiopulmonary function and prevention of secondary complications related to debility.

## 2019-03-12 NOTE — PROGRESS NOTE ADULT - SUBJECTIVE AND OBJECTIVE BOX
No acute events overnight. Afib rate controlled. Pain controlled. tolerating diet. Neuro intact. Completing course of abx for pyelonephritis.    AVSS  NAD  Abdomen soft, ND, NT    A/P 73M s/p fall resulting in right fx transferred from OSH for afib RVR and pyelonephritis  - afib rate controlled. appreciate cards recommendations  - completing coures of peylonephritis  - s/p right hip pinning  - DVT proph  - Dispo planning

## 2019-03-13 DIAGNOSIS — S32.000A WEDGE COMPRESSION FRACTURE OF UNSPECIFIED LUMBAR VERTEBRA, INITIAL ENCOUNTER FOR CLOSED FRACTURE: ICD-10-CM

## 2019-03-13 DIAGNOSIS — N12 TUBULO-INTERSTITIAL NEPHRITIS, NOT SPECIFIED AS ACUTE OR CHRONIC: ICD-10-CM

## 2019-03-13 LAB — GLUCOSE BLDC GLUCOMTR-MCNC: 103 MG/DL — HIGH (ref 70–99)

## 2019-03-13 PROCEDURE — 99233 SBSQ HOSP IP/OBS HIGH 50: CPT

## 2019-03-13 RX ADMIN — OXYCODONE HYDROCHLORIDE 5 MILLIGRAM(S): 5 TABLET ORAL at 05:26

## 2019-03-13 RX ADMIN — Medication 650 MILLIGRAM(S): at 18:07

## 2019-03-13 RX ADMIN — TAMSULOSIN HYDROCHLORIDE 0.4 MILLIGRAM(S): 0.4 CAPSULE ORAL at 21:07

## 2019-03-13 RX ADMIN — Medication 650 MILLIGRAM(S): at 06:52

## 2019-03-13 RX ADMIN — Medication 500 MILLIGRAM(S): at 17:23

## 2019-03-13 RX ADMIN — Medication 1 PATCH: at 12:50

## 2019-03-13 RX ADMIN — Medication 1 TABLET(S): at 11:25

## 2019-03-13 RX ADMIN — Medication 650 MILLIGRAM(S): at 11:24

## 2019-03-13 RX ADMIN — AMIODARONE HYDROCHLORIDE 200 MILLIGRAM(S): 400 TABLET ORAL at 05:26

## 2019-03-13 RX ADMIN — OXYCODONE HYDROCHLORIDE 5 MILLIGRAM(S): 5 TABLET ORAL at 00:00

## 2019-03-13 RX ADMIN — Medication 500 MILLIGRAM(S): at 23:36

## 2019-03-13 RX ADMIN — Medication 650 MILLIGRAM(S): at 17:22

## 2019-03-13 RX ADMIN — Medication 650 MILLIGRAM(S): at 14:57

## 2019-03-13 RX ADMIN — Medication 100 MILLIGRAM(S): at 11:25

## 2019-03-13 RX ADMIN — OXYCODONE HYDROCHLORIDE 5 MILLIGRAM(S): 5 TABLET ORAL at 22:07

## 2019-03-13 RX ADMIN — Medication 650 MILLIGRAM(S): at 23:37

## 2019-03-13 RX ADMIN — Medication 500 MILLIGRAM(S): at 11:24

## 2019-03-13 RX ADMIN — OXYCODONE HYDROCHLORIDE 5 MILLIGRAM(S): 5 TABLET ORAL at 10:57

## 2019-03-13 RX ADMIN — ENOXAPARIN SODIUM 30 MILLIGRAM(S): 100 INJECTION SUBCUTANEOUS at 17:23

## 2019-03-13 RX ADMIN — Medication 1 PATCH: at 00:00

## 2019-03-13 RX ADMIN — Medication 650 MILLIGRAM(S): at 00:45

## 2019-03-13 RX ADMIN — Medication 650 MILLIGRAM(S): at 05:26

## 2019-03-13 RX ADMIN — ENOXAPARIN SODIUM 30 MILLIGRAM(S): 100 INJECTION SUBCUTANEOUS at 05:27

## 2019-03-13 RX ADMIN — Medication 500 MILLIGRAM(S): at 05:26

## 2019-03-13 RX ADMIN — OXYCODONE HYDROCHLORIDE 5 MILLIGRAM(S): 5 TABLET ORAL at 06:52

## 2019-03-13 RX ADMIN — OXYCODONE HYDROCHLORIDE 5 MILLIGRAM(S): 5 TABLET ORAL at 21:07

## 2019-03-13 NOTE — PROGRESS NOTE ADULT - SUBJECTIVE AND OBJECTIVE BOX
Patient in bed, reports he has no pain.  Medication assist with pain control.   Patient is hungry and wants his food. Trays were being delivered.  Worked with therapy, but were limited due to restrictions on WB.  Performed well. Patient wants to only go home. He is frustrated.     FUNCTIONAL PROGRESS  3/12  Bed Mobility  Bed Mobility Training Sit-to-Supine: supervsion;  supervision;  verbal cues  Bed Mobility Training Supine-to-Sit: supervsion;  supervision;  verbal cues  Bed Mobility Training Limitations: decreased ROM;  decreased strength;  pain    Sit-Stand Transfer Training  Sit-to-Stand Transfer Training Charges: deferred, awaiting orders     Bathing Training:     · Level of [place]	minimum assist (75% patients effort); seated	  · Physical Assist/Nonphysical Assist	1 person assist	    Upper Body Dressing Training:     · Level of [place]	maximum assist (25% patients effort); to don LSO brace	  · Physical Assist/Nonphysical Assist	1 person assist	    Lower Body Dressing Training:     · Level of [place]	maximum assist (25% patients effort); to don socks	  · Physical Assist/Nonphysical Assist	1 person assist	    Toilet Hygiene Training:     · Level of [place]	to be assessed	    Grooming Training:     · Level of [place]	supervision	    Eating/Self-Feeding Training:     · Level of [place]	independent	    REVIEW OF SYSTEMS  Constitutional - No fever,  No fatigue  HEENT - No vertigo, No neck pain  Neurological - No headaches, No memory loss, +loss of strength, No numbness, No tremors  Skin - No rashes, +lesions   Musculoskeletal - +joint pain, +muscle pain  Psychiatric - No depression, +anxiety    VITALS  T(C): 36.3 (03-13-19 @ 07:00), Max: 36.6 (03-12-19 @ 15:33)  HR: 81 (03-13-19 @ 07:00) (79 - 93)  BP: 103/58 (03-13-19 @ 07:00) (80/51 - 103/59)  RR: 18 (03-13-19 @ 07:00) (18 - 18)  SpO2: 97% (03-13-19 @ 07:00) (95% - 97%)  Wt(kg): --    MEDICATIONS   acetaminophen   Tablet .. 650 milliGRAM(s) every 6 hours  ALBUTerol/ipratropium for Nebulization 3 milliLiter(s) every 4 hours PRN  aluminum hydroxide/magnesium hydroxide/simethicone Suspension 30 milliLiter(s) every 4 hours PRN  amiodarone    Tablet 200 milliGRAM(s) daily  cephalexin 500 milliGRAM(s) four times a day  enoxaparin Injectable 30 milliGRAM(s) every 12 hours  multivitamin 1 Tablet(s) daily  nicotine -  14 mG/24Hr(s) Patch 1 patch daily  oxyCODONE    IR 5 milliGRAM(s) every 6 hours PRN  tamsulosin 0.4 milliGRAM(s) at bedtime  thiamine 100 milliGRAM(s) daily      RECENT LABS - Reviewed                    ----------------------------------------------------------------------------------------  PHYSICAL EXAM  Constitutional - NAD, Comfortable  Extremities - Swelling of the right lateral thigh, bandages, some erythema, no discharge, No calf tenderness   Neurologic Exam -                    Motor - right LE weakness - limited by pain                    LEFT    LE - HF 5/5, KE 5/5, DF 5/5, PF 5/5                    RIGHT LE - HF 3/5, KE 4/5, DF 5/5, PF 5/5        Sensory - Intact to LT  Psychiatric - Anxious/frustrated, Affect Flat  ----------------------------------------------------------------------------------------  ASSESSMENT/PLAN  73yMale with functional deficits after a trauma sustaining a right femoral fracture s/p IMN and L1 compression fracture  Right femoral fracture - PWB **conflicting orders on WB status  EtOH - MVI, Thiamine, Add Folate  Pain - Tylenol, Oxycodone  New AFIB - Amiodarone  BPH - Flomax  New Pyelonephritis - Keflex - Please remove central line in right UE if not needed  L1 compression fracture - MRI completed, WBAT with TLSO OOB **conflicting orders on WB status, made ACS aware  DVT PPX - SCDs, Lovenox  Rehab - Will continue to follow for ongoing rehab needs and recommendations.  Progress will determine rehab dispo recommendations. Continue bedside therapy as well as OOB throughout the day with mobilization by staff to maintain cardiopulmonary function and prevention of secondary complications related to debility.

## 2019-03-13 NOTE — PROGRESS NOTE ADULT - NSICDXPROBLEM_GEN_ALL_CORE_FT
PROBLEM DIAGNOSES  Problem: Pyelonephritis  Assessment and Plan: finished IV antibiotics , now on keflex, voiding spontaneously, continue flomax, f.u with Sean in 1 month     Problem: Compression fx, lumbar spine  Assessment and Plan: subacute fx, brace when out of bed and ambulating     Problem: Closed fracture of neck of right femur, initial encounter  Assessment and Plan: protected weight bearing with rolling walker, dvt ppx for 4 weeks, f/u with orthopedics as an outpatient

## 2019-03-13 NOTE — PROGRESS NOTE ADULT - ASSESSMENT
73M hx Right eye blindness, depression, SI, ETOH abuse, current smoker, poor medical follow up with non obstructing left renal stone transferred 3/6 from [facility] [place]/p fall R femoral neck fracture and new onset AFib., While at [facility] was in PAF for approx 2hrs, currently remains in NSR while on amiodarone.   	  Afib: Maintaining SR  on  Amio 200 mg  Not candidate for AC due to  high risk of fall and poor medical follow up  Consider ASA     R femoral neck fracture s/p pinning 3/8  PT    CIWA protocol  As per primary team
73M initially found down now w/ with non obstructing left renal stone. New onset afib with RVR now in sinus rhythm, rate controlled. R femoral neck fracture s/p pinning    Plan:  - CIWA protocol  - Ortho: RLE WB protected  - zosyn (3/6- ) for pyelo.   - DVT ppx: lovenox  - f/u PT/OT/PMR (PT: extended care, home w/home PT)
73M initially found down now w/ with non obstructing left renal stone. new onset afib with RVR now in sinus rhythm, rate controlled. R femoral neck fracture s/p pinning. Downgraded from the floor.     - Amio transitioned to PO 200mg  - cardio recs appreciated   - CIWA protocol  - Ortho: RLE WB protected  - zosyn (3/6- ) for pyelo.   - DVT ppx: lovenox  - f/u PT/OT/PMR.
Atrial fibrillation, duration is not known  DC IV amiodarone  Start amiodarone 200 mg po daily from tomorrow  EKG in AM  Can DC metoprolol as well  Patient should ideally be on full dose anticoagulation, however due to ETOH abuse he is not an optimal candidate.   Change to full dose lovenox while in the hospital.   We can have further conversation with him once more alert and decide on long term AC.
Neuro: alcohol abuse; CIWA, librium    CV: afib with RVR; d/c amio gtt, convert to oral rate control med    Pulm: maintain O2>95%    GI/Nutrition: f/u speech and swallow for diet recommendations; initial evaluation patient too lethargic    /Renal: pyelonephritis with non obstructin renal stone; continue zosyn, possible d/c velazquez, TOV    ID: pyelonephritis; continue zosyn. 2 doses post op ancef     Lines/Tubes: velazquez; PIV, RUE midline    Endo: hypokalemia; replete potassium     heme/DVT: stable h/h. resume lovenox    Skin: dressing over right hip; protected weight bearing to RLE    MSK: PT/OT/PMR    Proph: lovenox    Dispo: SICU, possible downgrade to floor    CRITICAL CARE TIME SPENT:
Patient pyleonephritis resolving, on PO kelfex x1 week , IV antibiotic course complete, Urinary retention resolved, on flomax
72yo male with left renal pelvis stone, most likely non obstructing- no hydronephrosis  - recommend UA and urine cx  - no need for nephrostomy tube- pt has no hydronephrosis
72 yo male, ETOH abuse, s/p ORIF hip fx, pyelonephritis, urinary retention resolving.  - continue flomax  - recommend change abx to PO keflex 500mg PO 3x day for 1 more week  - oob to chair/ambulate as per ortho  - renal pelvic stone to be treated as outpt when infection completely resolved  - pt may f/u with urologist - Dr. Estrada in 1 month [phone]

## 2019-03-13 NOTE — PROGRESS NOTE ADULT - SUBJECTIVE AND OBJECTIVE BOX
Pt Name: AXEL ARGUELLES    MRN: [id number]        Patient is a 73y Male is status post R hip CRPP POD #5, L1 compression fx. Patient seen and examined at bedside this morning. No acute events reported overnight. Patient is doing well and is comfortable. The patient's pain is controlled using the prescribed pain medications. Denies nausea, vomiting, chest pain, shortness of breath, abdominal pain or fever. No new complaints. He states that he has not been progressing with physical therapy and did not get up out of bed yesterday. He is eager to get up for meals today.       PAST MEDICAL & SURGICAL HISTORY:  PAST MEDICAL & SURGICAL HISTORY:  Depression  Alcohol abuse with intoxication  H/O eye surgery    Allergies: No Known Allergies  Send vanilla health shakes TID - RD OK (Unknown)    Medications: acetaminophen   Tablet .. 650 milliGRAM(s) Oral every 6 hours  ALBUTerol/ipratropium for Nebulization 3 milliLiter(s) Nebulizer every 4 hours PRN  aluminum hydroxide/magnesium hydroxide/simethicone Suspension 30 milliLiter(s) Oral every 4 hours PRN  amiodarone    Tablet 200 milliGRAM(s) Oral daily  cephalexin 500 milliGRAM(s) Oral four times a day  enoxaparin Injectable 30 milliGRAM(s) SubCutaneous every 12 hours  insulin lispro (HumaLOG) corrective regimen sliding scale   SubCutaneous three times a day before meals  multivitamin 1 Tablet(s) Oral daily  nicotine -  14 mG/24Hr(s) Patch 1 patch Transdermal daily  oxyCODONE    IR 5 milliGRAM(s) Oral every 6 hours PRN  tamsulosin 0.4 milliGRAM(s) Oral at bedtime  thiamine 100 milliGRAM(s) Oral daily      PHYSICAL EXAM:    Vital Signs Last 24 Hrs  T(C): 36.2 (13 Mar 2019 00:00), Max: 36.6 (12 Mar 2019 15:33)  T(F): 97.1 (13 Mar 2019 00:00), Max: 97.8 (12 Mar 2019 15:33)  HR: 79 (13 Mar 2019 00:00) (79 - 93)  BP: 103/59 (13 Mar 2019 00:00) (80/51 - 103/59)  BP(mean): --  RR: 18 (13 Mar 2019 00:00) (18 - 18)  SpO2: 95% (13 Mar 2019 00:00) (95% - 96%)  Daily     Daily     Appearance: Alert, responsive, in no acute distress.    Physical exam: RLE: The dressing is clean, dry and intact. No drainage or discharge. Mild erythema noted. No blistering. No ecchymosis. The calf is supple nontender. Sensation to light touch is grossly intact distally. Extensor hallucis longus and flexor hallucis longus are intact. No foot drop. 2+ dorsalis pedis pulse. Capillary refill is less than 2 seconds. No cyanosis.  Lumbar: mild tenderness to palpation upper lumbar paraspinal muscles. skin intact.        A/P:  73y Male is status post R hip CRPP POD # 4, L1 compression fx        Plan:   • Pain control as clinically indicated  • DVT prophylaxis as prescribed, including use of compression devices and ankle pumps  • Continue physical therapy  • Incentive spirometry encouraged  • WBAT b/l lower extremity with TLSO brace when OOB  • Discharge planning as per primary team

## 2019-03-13 NOTE — PROGRESS NOTE ADULT - SUBJECTIVE AND OBJECTIVE BOX
SUBJECTIVE/24 hour events:  Patient is a 73yMale found down, post op #5 of right hip pinning, RLE WBAT with walker, off IV abx for pyleonephritis now on PO keflex, afib resolved, L1 compression fx likely subacute pt could not tolerate completing MRI. Awaiting placement to [facility] no acute events overnight       Vital Signs Last 24 Hrs  T(C): 36.2 (13 Mar 2019 00:00), Max: 36.6 (12 Mar 2019 15:33)  T(F): 97.1 (13 Mar 2019 00:00), Max: 97.8 (12 Mar 2019 15:33)  HR: 79 (13 Mar 2019 00:00) (79 - 93)  BP: 103/59 (13 Mar 2019 00:00) (80/51 - 103/59)  BP(mean): --  RR: 18 (13 Mar 2019 00:00) (18 - 18)  SpO2: 95% (13 Mar 2019 00:00) (95% - 96%)  Drug Dosing Weight  Height (cm): 185.42 (09 Mar 2019 10:00)  Weight (kg): 66.3 (06 Mar 2019 20:38)  BMI (kg/m2): 19.3 (09 Mar 2019 10:00)  BSA (m2): 1.88 (09 Mar 2019 10:00)  I&O's Detail    Allergies    No Known Allergies    Intolerances    Send vanilla health shakes TID - RD OK (Unknown)              ROS:    PHYSICAL EXAM:  Constitutional: in no distress    Respiratory: no conversational dyspnea, CTAB    Cardiovascular: NSR    Gastrointestinal: abdomen soft, non-tender, atraumatic     Genitourinary: voiding spontaneously   Rectal:  Extremities:  Vascular:  Neurological:  Skin:  Musculoskeletal:  Psychiatric:        MEDICATIONS  (STANDING):  acetaminophen   Tablet .. 650 milliGRAM(s) Oral every 6 hours  amiodarone    Tablet 200 milliGRAM(s) Oral daily  cephalexin 500 milliGRAM(s) Oral four times a day  enoxaparin Injectable 30 milliGRAM(s) SubCutaneous every 12 hours  insulin lispro (HumaLOG) corrective regimen sliding scale   SubCutaneous three times a day before meals  multivitamin 1 Tablet(s) Oral daily  nicotine -  14 mG/24Hr(s) Patch 1 patch Transdermal daily  tamsulosin 0.4 milliGRAM(s) Oral at bedtime  thiamine 100 milliGRAM(s) Oral daily    MEDICATIONS  (PRN):  ALBUTerol/ipratropium for Nebulization 3 milliLiter(s) Nebulizer every 4 hours PRN Bronchospasm  aluminum hydroxide/magnesium hydroxide/simethicone Suspension 30 milliLiter(s) Oral every 4 hours PRN Dyspepsia  oxyCODONE    IR 5 milliGRAM(s) Oral every 6 hours PRN Severe Pain (7 - 10)      RADIOLOGY STUDIES:    CULTURES: SUBJECTIVE/24 hour events:  Patient is a 73yMale found down, post op #5 of right hip pinning, RLE WBAT with walker, off IV abx for pyleonephritis now on PO keflex, afib resolved, L1 compression fx likely subacute pt could not tolerate completing MRI. Awaiting placement to [facility] no acute events overnight       Vital Signs Last 24 Hrs  T(C): 36.2 (13 Mar 2019 00:00), Max: 36.6 (12 Mar 2019 15:33)  T(F): 97.1 (13 Mar 2019 00:00), Max: 97.8 (12 Mar 2019 15:33)  HR: 79 (13 Mar 2019 00:00) (79 - 93)  BP: 103/59 (13 Mar 2019 00:00) (80/51 - 103/59)  BP(mean): --  RR: 18 (13 Mar 2019 00:00) (18 - 18)  SpO2: 95% (13 Mar 2019 00:00) (95% - 96%)  Drug Dosing Weight  Height (cm): 185.42 (09 Mar 2019 10:00)  Weight (kg): 66.3 (06 Mar 2019 20:38)  BMI (kg/m2): 19.3 (09 Mar 2019 10:00)  BSA (m2): 1.88 (09 Mar 2019 10:00)  I&O's Detail    Allergies    No Known Allergies    Intolerances    Send vanilla health shakes TID - RD OK (Unknown)              ROS:    PHYSICAL EXAM:  Constitutional: in no distress    Respiratory: no conversational dyspnea, CTAB    Cardiovascular: NSR    Gastrointestinal: abdomen soft, non-tender, atraumatic     Genitourinary: voiding spontaneously     Extremities: atraumatic     Neurological: gcs15, A&Ox3    Skin: warm, dry and no rashes         MEDICATIONS  (STANDING):  acetaminophen   Tablet .. 650 milliGRAM(s) Oral every 6 hours  amiodarone    Tablet 200 milliGRAM(s) Oral daily  cephalexin 500 milliGRAM(s) Oral four times a day  enoxaparin Injectable 30 milliGRAM(s) SubCutaneous every 12 hours  insulin lispro (HumaLOG) corrective regimen sliding scale   SubCutaneous three times a day before meals  multivitamin 1 Tablet(s) Oral daily  nicotine -  14 mG/24Hr(s) Patch 1 patch Transdermal daily  tamsulosin 0.4 milliGRAM(s) Oral at bedtime  thiamine 100 milliGRAM(s) Oral daily    MEDICATIONS  (PRN):  ALBUTerol/ipratropium for Nebulization 3 milliLiter(s) Nebulizer every 4 hours PRN Bronchospasm  aluminum hydroxide/magnesium hydroxide/simethicone Suspension 30 milliLiter(s) Oral every 4 hours PRN Dyspepsia  oxyCODONE    IR 5 milliGRAM(s) Oral every 6 hours PRN Severe Pain (7 - 10)      RADIOLOGY STUDIES:    CULTURES:

## 2019-03-14 ENCOUNTER — INBOUND DOCUMENT (OUTPATIENT)
Age: 74
End: 2019-03-14

## 2019-03-14 VITALS
DIASTOLIC BLOOD PRESSURE: 63 MMHG | RESPIRATION RATE: 18 BRPM | HEART RATE: 94 BPM | OXYGEN SATURATION: 95 % | TEMPERATURE: 98 F | SYSTOLIC BLOOD PRESSURE: 99 MMHG

## 2019-03-14 PROCEDURE — 84145 PROCALCITONIN (PCT): CPT

## 2019-03-14 PROCEDURE — 84484 ASSAY OF TROPONIN QUANT: CPT

## 2019-03-14 PROCEDURE — 92610 EVALUATE SWALLOWING FUNCTION: CPT

## 2019-03-14 PROCEDURE — 97110 THERAPEUTIC EXERCISES: CPT

## 2019-03-14 PROCEDURE — 72148 MRI LUMBAR SPINE W/O DYE: CPT

## 2019-03-14 PROCEDURE — 82553 CREATINE MB FRACTION: CPT

## 2019-03-14 PROCEDURE — 93005 ELECTROCARDIOGRAM TRACING: CPT

## 2019-03-14 PROCEDURE — 97163 PT EVAL HIGH COMPLEX 45 MIN: CPT

## 2019-03-14 PROCEDURE — 81001 URINALYSIS AUTO W/SCOPE: CPT

## 2019-03-14 PROCEDURE — 97116 GAIT TRAINING THERAPY: CPT

## 2019-03-14 PROCEDURE — C1713: CPT

## 2019-03-14 PROCEDURE — 83735 ASSAY OF MAGNESIUM: CPT

## 2019-03-14 PROCEDURE — 93306 TTE W/DOPPLER COMPLETE: CPT

## 2019-03-14 PROCEDURE — 86900 BLOOD TYPING SEROLOGIC ABO: CPT

## 2019-03-14 PROCEDURE — 72100 X-RAY EXAM L-S SPINE 2/3 VWS: CPT

## 2019-03-14 PROCEDURE — 84132 ASSAY OF SERUM POTASSIUM: CPT

## 2019-03-14 PROCEDURE — 87040 BLOOD CULTURE FOR BACTERIA: CPT

## 2019-03-14 PROCEDURE — 36415 COLL VENOUS BLD VENIPUNCTURE: CPT

## 2019-03-14 PROCEDURE — 85730 THROMBOPLASTIN TIME PARTIAL: CPT

## 2019-03-14 PROCEDURE — 86803 HEPATITIS C AB TEST: CPT

## 2019-03-14 PROCEDURE — 86923 COMPATIBILITY TEST ELECTRIC: CPT

## 2019-03-14 PROCEDURE — 82330 ASSAY OF CALCIUM: CPT

## 2019-03-14 PROCEDURE — 99232 SBSQ HOSP IP/OBS MODERATE 35: CPT

## 2019-03-14 PROCEDURE — 80076 HEPATIC FUNCTION PANEL: CPT

## 2019-03-14 PROCEDURE — 82435 ASSAY OF BLOOD CHLORIDE: CPT

## 2019-03-14 PROCEDURE — 92526 ORAL FUNCTION THERAPY: CPT

## 2019-03-14 PROCEDURE — 97530 THERAPEUTIC ACTIVITIES: CPT

## 2019-03-14 PROCEDURE — 76775 US EXAM ABDO BACK WALL LIM: CPT

## 2019-03-14 PROCEDURE — 82803 BLOOD GASES ANY COMBINATION: CPT

## 2019-03-14 PROCEDURE — 85027 COMPLETE CBC AUTOMATED: CPT

## 2019-03-14 PROCEDURE — 86901 BLOOD TYPING SEROLOGIC RH(D): CPT

## 2019-03-14 PROCEDURE — 86850 RBC ANTIBODY SCREEN: CPT

## 2019-03-14 PROCEDURE — 72170 X-RAY EXAM OF PELVIS: CPT

## 2019-03-14 PROCEDURE — 82962 GLUCOSE BLOOD TEST: CPT

## 2019-03-14 PROCEDURE — 83036 HEMOGLOBIN GLYCOSYLATED A1C: CPT

## 2019-03-14 PROCEDURE — 83605 ASSAY OF LACTIC ACID: CPT

## 2019-03-14 PROCEDURE — 73501 X-RAY EXAM HIP UNI 1 VIEW: CPT

## 2019-03-14 PROCEDURE — 84100 ASSAY OF PHOSPHORUS: CPT

## 2019-03-14 PROCEDURE — 80048 BASIC METABOLIC PNL TOTAL CA: CPT

## 2019-03-14 PROCEDURE — 85014 HEMATOCRIT: CPT

## 2019-03-14 PROCEDURE — 82550 ASSAY OF CK (CPK): CPT

## 2019-03-14 PROCEDURE — 73552 X-RAY EXAM OF FEMUR 2/>: CPT

## 2019-03-14 PROCEDURE — 97535 SELF CARE MNGMENT TRAINING: CPT

## 2019-03-14 PROCEDURE — 85610 PROTHROMBIN TIME: CPT

## 2019-03-14 PROCEDURE — 82947 ASSAY GLUCOSE BLOOD QUANT: CPT

## 2019-03-14 PROCEDURE — 71045 X-RAY EXAM CHEST 1 VIEW: CPT

## 2019-03-14 PROCEDURE — 84295 ASSAY OF SERUM SODIUM: CPT

## 2019-03-14 PROCEDURE — 97167 OT EVAL HIGH COMPLEX 60 MIN: CPT

## 2019-03-14 PROCEDURE — 94640 AIRWAY INHALATION TREATMENT: CPT

## 2019-03-14 PROCEDURE — 76000 FLUOROSCOPY <1 HR PHYS/QHP: CPT

## 2019-03-14 RX ORDER — CEPHALEXIN 500 MG
1 CAPSULE ORAL
Qty: 16 | Refills: 0 | OUTPATIENT
Start: 2019-03-14 | End: 2019-03-17

## 2019-03-14 RX ORDER — CEPHALEXIN 500 MG
500 CAPSULE ORAL
Qty: 0 | Refills: 0 | Status: DISCONTINUED | OUTPATIENT
Start: 2019-03-14 | End: 2019-03-14

## 2019-03-14 RX ADMIN — Medication 650 MILLIGRAM(S): at 11:47

## 2019-03-14 RX ADMIN — OXYCODONE HYDROCHLORIDE 5 MILLIGRAM(S): 5 TABLET ORAL at 03:21

## 2019-03-14 RX ADMIN — ENOXAPARIN SODIUM 30 MILLIGRAM(S): 100 INJECTION SUBCUTANEOUS at 06:26

## 2019-03-14 RX ADMIN — OXYCODONE HYDROCHLORIDE 5 MILLIGRAM(S): 5 TABLET ORAL at 04:20

## 2019-03-14 RX ADMIN — Medication 100 MILLIGRAM(S): at 11:48

## 2019-03-14 RX ADMIN — OXYCODONE HYDROCHLORIDE 5 MILLIGRAM(S): 5 TABLET ORAL at 09:53

## 2019-03-14 RX ADMIN — AMIODARONE HYDROCHLORIDE 200 MILLIGRAM(S): 400 TABLET ORAL at 10:24

## 2019-03-14 RX ADMIN — Medication 500 MILLIGRAM(S): at 11:48

## 2019-03-14 RX ADMIN — Medication 500 MILLIGRAM(S): at 06:27

## 2019-03-14 RX ADMIN — OXYCODONE HYDROCHLORIDE 5 MILLIGRAM(S): 5 TABLET ORAL at 16:13

## 2019-03-14 RX ADMIN — Medication 650 MILLIGRAM(S): at 00:30

## 2019-03-14 RX ADMIN — Medication 650 MILLIGRAM(S): at 06:27

## 2019-03-14 RX ADMIN — Medication 1 TABLET(S): at 11:48

## 2019-03-14 NOTE — PROGRESS NOTE ADULT - SUBJECTIVE AND OBJECTIVE BOX
ORTHO-POST-OP PROGRESS NOTE:      [id number]    AXEL ARGUELLES      PROCEDURE: right hip Closed Reduction Percutaneous Pinning     DOS: 3/8/2019      SUBJECTIVE: 73y Patient seen and examined. Patient reports of moderate discomfort that is controlled by pain medications. Patient denies of acute sensory or motor changes. He was out of bed to chair yesterday.            I&O's Detail    13 Mar 2019 07:01  -  14 Mar 2019 07:00  --------------------------------------------------------  IN:  Total IN: 0 mL    OUT:    Voided: 1100 mL  Total OUT: 1100 mL    Total NET: -1100 mL            acetaminophen   Tablet .. 650 milliGRAM(s) Oral every 6 hours  ALBUTerol/ipratropium for Nebulization 3 milliLiter(s) Nebulizer every 4 hours PRN  aluminum hydroxide/magnesium hydroxide/simethicone Suspension 30 milliLiter(s) Oral every 4 hours PRN  amiodarone    Tablet 200 milliGRAM(s) Oral daily  cephalexin 500 milliGRAM(s) Oral four times a day  enoxaparin Injectable 30 milliGRAM(s) SubCutaneous every 12 hours  multivitamin 1 Tablet(s) Oral daily  nicotine -  14 mG/24Hr(s) Patch 1 patch Transdermal daily  oxyCODONE    IR 5 milliGRAM(s) Oral every 6 hours PRN  tamsulosin 0.4 milliGRAM(s) Oral at bedtime  thiamine 100 milliGRAM(s) Oral daily        T(C): 36.2 (03-13-19 @ 23:36), Max: 37.1 (03-13-19 @ 17:16)  HR: 82 (03-13-19 @ 23:36) (82 - 85)  BP: 85/50 (03-14-19 @ 05:15) (85/50 - 131/77)  RR: 18 (03-13-19 @ 23:36) (18 - 18)  SpO2: 97% (03-13-19 @ 23:36) (97% - 99%)  Wt(kg): --      PHYSICAL EXAM:     Constitutional: Alert, responsive, in no acute distress.     Injured Extremity:          Dressing: Clean/dry/intact. No active bleeding or discharge noted.            Skin: Wound is clean and intact. No active discharge.             Sensation: normal to light touch. No focal deficits noted of the lower extremities.                Motor exam: 4+/5 hip flexion, knee flexion and ankle plantar and dorsiflexion. No focal weaknesses noted. No calf tenderness.                  Vascular:  + warm well perfused; capillary refill <3 seconds                                                       A/P :  71y Male S/P right hip Closed Reduction Percutaneous Pinning  POD# 3    -  Pain control  -  DVT ppx: [x]SCDs   [x] Pharmacolgic    -  PT and out of bed today  -  Weight bearing status: WBAT [X]        PWB    [ ]     TTWB  [ ]      NWB  [ ]  -  Dispo: likely [facility]  - office follow-up in 2 weeks

## 2019-03-14 NOTE — PROGRESS NOTE ADULT - SUBJECTIVE AND OBJECTIVE BOX
[id number]    History:  The patient is currently being treated non-operatively for a lumbar compression fracture. Patient is doing well. The patient's pain is controlled using the prescribed pain medications. The patient is participating in physical therapy and out of bed to chair yesterday. He does not wish to use the LSO brace.  Denies nausea, vomiting, chest pain, shortness of breath, abdominal pain or fever. No new complaints. No acute motor or sensory changes are reported.         Vital Signs Last 24 Hrs  T(C): 36.2 (13 Mar 2019 23:36), Max: 37.1 (13 Mar 2019 17:16)  T(F): 97.2 (13 Mar 2019 23:36), Max: 98.7 (13 Mar 2019 17:16)  HR: 82 (13 Mar 2019 23:36) (82 - 85)  BP: 85/50 (14 Mar 2019 05:15) (85/50 - 131/77)  BP(mean): --  RR: 18 (13 Mar 2019 23:36) (18 - 18)  SpO2: 97% (13 Mar 2019 23:36) (97% - 99%)              MEDICATIONS  (STANDING):  acetaminophen   Tablet .. 650 milliGRAM(s) Oral every 6 hours  amiodarone    Tablet 200 milliGRAM(s) Oral daily  cephalexin 500 milliGRAM(s) Oral four times a day  enoxaparin Injectable 30 milliGRAM(s) SubCutaneous every 12 hours  multivitamin 1 Tablet(s) Oral daily  nicotine -  14 mG/24Hr(s) Patch 1 patch Transdermal daily  tamsulosin 0.4 milliGRAM(s) Oral at bedtime  thiamine 100 milliGRAM(s) Oral daily    MEDICATIONS  (PRN):  ALBUTerol/ipratropium for Nebulization 3 milliLiter(s) Nebulizer every 4 hours PRN Bronchospasm  aluminum hydroxide/magnesium hydroxide/simethicone Suspension 30 milliLiter(s) Oral every 4 hours PRN Dyspepsia  oxyCODONE    IR 5 milliGRAM(s) Oral every 6 hours PRN Severe Pain (7 - 10)      Physical exam: Sensation to light touch of the lower extremities is grossly intact without focal deficit and is symmetric bilaterally. No tremor or clonus. Babinski test is negative. Motor function is 5/5 without focal deficit. No calf tenderness. There is good passive range of motion of the hips and knees without noted joint pain provocation. 2+ dorsalis pedis pulse. Capillary refill is less than 2 seconds. No cyanosis.    Primary Orthopedic Assessment:  • Lumbar Compression Fracture    Secondary  Orthopedic Assessment(s):   •     Secondary  Medical Assessment(s):   Pyelonephritis  Alcohol withdrawal syndrome, with delirium: Alcohol withdrawal syndrome, with delirium  Atrial fibrillation with RVR: Atrial fibrillation with RVR      Plan:   • DVT prophylaxis as prescribed, including use of compression devices and ankle pumps  • Continue physical therapy  • Weightbearing as tolerated of the lower extremities with assistance of a walker  • Incentive spirometry encouraged  • Pain control as clinically indicated  • LSO brace to be used when out of bed and ambulating for comfort  • Discharge planning – anticipated discharge is likely subacute rehabilitation vs acute rehabilitation

## 2019-03-14 NOTE — PROGRESS NOTE ADULT - ATTENDING COMMENTS
Clinically stable. TLSO brace. Ambulated with PT. Complete course of abx for pyelonephritis. Awaiting placement.
Dispo planning depending on PT evaluation. Will reconfirm weight bearing status from ortho and spine. complete course of abx for pyelonephritis.
Agree with above.  The patient is with no pain.  He has been able to void this morning.  Abdomen is soft and non tender, non distended.  The patient is to continue with PT.  Will keep urinary catheter out as long as the patient is able to void.

## 2019-03-14 NOTE — PROGRESS NOTE ADULT - SUBJECTIVE AND OBJECTIVE BOX
INTERVAL HPI/OVERNIGHT EVENTS/SUBJECTIVE:  Patient continues to wait for discharge to [facility]. Working with PT/OT. Tolerating diet. pain controlled with current regimen.     Vital Signs Last 24 Hrs  T(C): 36.7 (14 Mar 2019 08:00), Max: 37.1 (13 Mar 2019 17:16)  T(F): 98 (14 Mar 2019 08:00), Max: 98.7 (13 Mar 2019 17:16)  HR: 81 (14 Mar 2019 08:00) (81 - 85)  BP: 92/59 (14 Mar 2019 08:00) (85/50 - 131/77)  BP(mean): --  ABP: --  ABP(mean): --  RR: 18 (14 Mar 2019 08:00) (18 - 18)  SpO2: 97% (14 Mar 2019 08:00) (97% - 99%)      I&O's Detail    13 Mar 2019 07:01  -  14 Mar 2019 07:00  --------------------------------------------------------  IN:  Total IN: 0 mL    OUT:    Voided: 1100 mL  Total OUT: 1100 mL    Total NET: -1100 mL      MEDICATIONS  (STANDING):  acetaminophen   Tablet .. 650 milliGRAM(s) Oral every 6 hours  amiodarone    Tablet 200 milliGRAM(s) Oral daily  cephalexin 500 milliGRAM(s) Oral four times a day  enoxaparin Injectable 30 milliGRAM(s) SubCutaneous every 12 hours  multivitamin 1 Tablet(s) Oral daily  nicotine -  14 mG/24Hr(s) Patch 1 patch Transdermal daily  tamsulosin 0.4 milliGRAM(s) Oral at bedtime  thiamine 100 milliGRAM(s) Oral daily    MEDICATIONS  (PRN):  ALBUTerol/ipratropium for Nebulization 3 milliLiter(s) Nebulizer every 4 hours PRN Bronchospasm  aluminum hydroxide/magnesium hydroxide/simethicone Suspension 30 milliLiter(s) Oral every 4 hours PRN Dyspepsia  oxyCODONE    IR 5 milliGRAM(s) Oral every 6 hours PRN Severe Pain (7 - 10)    NUTRITION/IVF: As tolerated    PHYSICAL EXAM:    Gen: well appearing male in NAD    Neurological: A&Ox3, no focal deficits. ZAPATA    Pulmonary: unlabored    Gastrointestinal: soft, nd, nt. No guarding or rebound    Genitourinary: void    Back: Brace at bed side    Extremities: no c/c/e    Skin: intact        LABS:    RECENT CULTURES:    CAPILLARY BLOOD GLUCOSE      RADIOLOGY & ADDITIONAL STUDIES:    ASSESSMENT/PLAN:  73yMale presenting with:    -Continue with d/c planning to [facility]  -Spirometry  -DVT proph  -Diet as tolerated  -Home meds are on board  -Avoid deliriogenic medications  -Maintain sleep/wake cycle

## 2019-03-14 NOTE — PROGRESS NOTE ADULT - PROVIDER SPECIALTY LIST ADULT
Cardiology
Orthopedics
Rehab Medicine
Rehab Medicine
SICU
Trauma Surgery
Urology
Rehab Medicine

## 2019-03-14 NOTE — PROGRESS NOTE ADULT - REASON FOR ADMISSION
s/p found down

## 2019-03-14 NOTE — PROGRESS NOTE ADULT - SUBJECTIVE AND OBJECTIVE BOX
Patient does not know how he feels.   he does not know how is doing or recovering, as he hears different messages from different people.  He does not know where he wants to go and needs to speak to his daughter about it.   He is making progress with therapy, but notes he does not know if he will need rehab.     FUNCTIONAL PROGRESS  3/13  Bed-Chair Transfer Training  Transfer Training Bed-to-Chair Transfer: minimum assist (75% patient effort);  1 person assist  Transfer Training Chair-to-Bed Transfer: rolling walker  Bed-to-Chair Transfer Training Transfer Safety Analysis: impaired balance;  impaired motor control    Sit-Stand Transfer Training  Transfer Training Sit-to-Stand Transfer: minimum assist (75% patient effort);  1 person assist  Transfer Training Stand-to-Sit Transfer: minimum assist (75% patient effort);  1 person assist    Toilet Transfer Training  Transfer Training Toilet Transfer: minimum assist (75% patient effort);  moderate assist (50% patient effort);  1 person assist;  on low toilet height  Toilet Transfer Training Transfer Safety Analysis: impaired balance    Functional Endurance  Functional Endurance Detail: Pt ambulated with RW with minimal assist/contact guard in room and hallway and fair endurance.     Upper Body Dressing Training  Upper Body Dressing Training Assistance: maximum assist (25% patient effort);  1 person assist;  to don LSO brace    Bed Mobility  Bed Mobility Training Supine-to-Sit: supervsion;  supervision  Bed Mobility Training Limitations: decreased ROM;  pain    Sit-Stand Transfer Training  Transfer Training Sit-to-Stand Transfer: contact guard;  1 person assist;  weight-bearing as tolerated   Right LE    rolling walker;  verbal cues  Transfer Training Stand-to-Sit Transfer: contact guard;  1 person assist;  weight-bearing as tolerated   Right LE    rolling walker;  verbal cues  Sit-to-Stand Transfer Training Transfer Safety Analysis: decreased strength;  pain;  decreased ROM    Gait Training  Gait Training: contact guard;  1 person assist;  verbal cues;  weight-bearing as tolerated   Right LE    rolling walker;  25 feet  Gait Analysis: swing-through gait   decreased yash;  pain;  decreased ROM;  decreased strength  Brace/Orthotics Brace/Orthotics: LSO  Gait Number of Times:: x 2  Type of Rest Type of Rest: sitting  Duration of Rest Duration of Rest: 5 min       REVIEW OF SYSTEMS  Constitutional - No fever,  +fatigue  HEENT - No vertigo, No neck pain  Neurological - No headaches, +memory loss, +loss of strength, No numbness, No tremors  Skin - No rashes, +lesions   Musculoskeletal - +joint pain, +joint swelling, +muscle pain  Psychiatric - +depression, +anxiety    VITALS  T(C): 36.7 (03-14-19 @ 08:00), Max: 37.1 (03-13-19 @ 17:16)  HR: 81 (03-14-19 @ 08:00) (81 - 85)  BP: 92/58 (03-14-19 @ 10:18) (85/50 - 131/77)  RR: 18 (03-14-19 @ 08:00) (18 - 18)  SpO2: 97% (03-14-19 @ 08:00) (97% - 99%)  Wt(kg): --    MEDICATIONS   acetaminophen   Tablet .. 650 milliGRAM(s) every 6 hours  ALBUTerol/ipratropium for Nebulization 3 milliLiter(s) every 4 hours PRN  aluminum hydroxide/magnesium hydroxide/simethicone Suspension 30 milliLiter(s) every 4 hours PRN  amiodarone    Tablet 200 milliGRAM(s) daily  cephalexin 500 milliGRAM(s) four times a day  enoxaparin Injectable 30 milliGRAM(s) every 12 hours  multivitamin 1 Tablet(s) daily  nicotine -  14 mG/24Hr(s) Patch 1 patch daily  oxyCODONE    IR 5 milliGRAM(s) every 6 hours PRN  tamsulosin 0.4 milliGRAM(s) at bedtime  thiamine 100 milliGRAM(s) daily      RECENT LABS - Reviewed                  -------------------------------------------------------------------------------  PHYSICAL EXAM  Constitutional - NAD, Comfortable  Extremities - Swelling of the right lateral thigh, bandages, some erythema, no discharge, No calf tenderness   Neurologic Exam -                    Motor - right LE weakness - limited by pain                    LEFT    LE - HF 5/5, KE 5/5, DF 5/5, PF 5/5                    RIGHT LE - HF 3/5, KE 4/5, DF 5/5, PF 5/5        Sensory - Intact to LT  Psychiatric - Anxious/frustrated, Affect Flat  ----------------------------------------------------------------------------------------  ASSESSMENT/PLAN  73yMale with functional deficits after a trauma sustaining a right femoral fracture s/p IMN and L1 compression fracture  Right femoral fracture - PWB **conflicting orders on WB status  EtOH - MVI, Thiamine   Pain - Tylenol, Oxycodone  New AFIB - Amiodarone  BPH - Flomax  New Pyelonephritis - Keflex    L1 compression fracture - MRI completed, WBAT with TLSO OOB **conflicting orders on WB status, made ACS aware  DVT PPX - SCDs, Lovenox  Rehab - Patient planned for [facility]. Continue bedside therapy as well as OOB throughout the day with mobilization by staff to maintain cardiopulmonary function and prevention of secondary complications related to debility. 	  Will sign off, please reconsult for additional rehab dispo needs if functional status changes.

## 2019-03-15 NOTE — CDI QUERY NOTE - NSCDIOTHERTXTBX_GEN_ALL_CORE_HH
Can you please specify the type of encephalopathy associated with patient’s condition?    A.	Toxic metabolic encephalopathy  B.	Metabolic encephalopathy  C.	Toxic Encephalopathy  D.	Other, please specify  E.	Not clinically significant    Supporting Documentations:    3/8/19 1117 Progress Note Adult- SICU Attending:  Neuro: ETOH abuse, encephalopathic secondary to infection and ETOH withdrawal. Target Rass 0, and provide adequate analgesic and anxiolysis. CIWA protocol

## 2019-03-15 NOTE — CDI QUERY NOTE - NSCDIOTHERTXTBX2_GEN_ALL_CORE_FT
There seems to be a conflicting documentations regarding presence of Sepsis, can you please clarify it’s status?     A.	Sepsis ruled out  B.	Sepsis present on admission, resolved prior discharge.  C.	Other, please specify  D.	Not clinically significant    Supporting Documentations:    Laboratory:     WBC:  3/6/19: 9.6  3/7/19: 12.3H  3/8/19: 5.8  3/9/19: 6.5  3/10/19: 6.0  3/11/19: 5.8    Procalcitonin:   3/6/19: 0.15H    Blood Gas Arterial, Lactate:  3/6/19: 1.0    Medications:  Piperacillin/ Tazobactam IVPB 3.375grams every 8 hours. Indication: Pyelonephritis. Ordered 3/6/19-3/11/19  Cephalexin 500 mg Oral 4x a day. Indication; Pyelonephritis. Ordered 3/11/19-3/14/19    3/6/19 @ 1539 CPOE Vital Signs Adult:   Heart Rate Heart Rate (beats/min): 89 /min  Respiration Rate (breaths/min) Respiration Rate (breaths/min): 25 /min    3/6/19 @ 1700 CPOE Vital Signs Adult;   Respiration Rate (breaths/min) Respiration Rate (breaths/min): 24 /min    H & P Adult;   • Assessment	  73 year old male s/p found down for unknown duration found to be septic likely from pyelonephritis, new onset atrial fibrillation with RVR and traumatic injuries of lumbar spine fracture and right femoral neck fracture   ID: sepsis; repeat labs, broad spectrum antibiotics, blood cultures, repeat lactate    3/6/19 1808 Consult Note Adult-Orthopedics PA/ Attending;  At outside hospital, patient was found to have a leukocytosis, tachypneic, tachycardic with a lactic acidosis.   With regard to his L1 compression fracture I think LSO bracing as appropriate once patient is out of the ICU sepsis has been deemed negative and believe this patient will have an MRI of the lumbar spine as well as thoracic spine to make sure there is no adjacent level/additional fractures.    3/6/19 1926 Progress Note Adult-Urology Attending;  Left kidney stone, not septic and no need for intervention at this time.     3/9/19 @ 0853 Progress Note Adult-SICU Resident:  ASSESSMENT/PLAN:  73y Male Sepsis with non obstructing left renal stone. new onset afib with RVR now in sinus rhythm, rate controlled. R femoral neck fracture s/p pinning

## 2019-03-15 NOTE — CDI QUERY NOTE - [id number]_GEN_ALL_CORE_FT
Can you please provide significant clinical diagnosis based on assessment and  interventions provided?    A.	Suspected thiamine deficiency due to alcohol abuse  B.	No thiamine deficiency  C.	Other, please specify  D.	Not clinically significant      Supporting Documentations:    MEDICATIONS;    folic acid Injectable 1 milliGRAM(s) IV Push daily  multivitamin 1 Tablet(s) Oral daily  thiamine IVPB 500 milliGRAM(s) IV Intermittent every 8 hours    H & P Adult;  History of Present Illness:   73 year old male transfer from [facility] after patient was found down for unknown duration of time at home covered in feces. noted to have a right femoral neck fracture and developed new onset atrial fibrillation with rapid ventricular rate. Subsequently started on a cardizem and amiodarone drip. Patient was also placed on a CIWA protocol due to known history of alcohol dependence.  neuro: alcohol dependence; lumbar spine compression fracture. AMS (unable to assess baseline) - CIWA protocol. maintain cervical collar until sober. f/u spine consultation    3/8/19 1117 Progress Note Adult-SICU Attending:  SSESSMENT/PLAN:  73y male s/p fall with pyelonephritis and afib RVR transferred from OSH per urology request from OSH  R femoral neck fx  L1 compression fx  Afib RVR  Pyelonephritis  ETOH abuse  Neuro: ETOH abuse, encephalopathic secondary to infection and ETOH withdrawal. Target Rass 0, and provide adequate analgesic and anxiolysis. CIWA protocol      3/9/19 8287 Progress Note Adult-SICU Resident:  • Assessment	  Neuro: alcohol abuse; CIWA, Librium    Discharge Note Adult;:  Secondary Diagnosis: Alcohol withdrawal syndrome, with delirium  Goal:	Alcohol cessation

## 2019-03-18 PROBLEM — F32.9 MAJOR DEPRESSIVE DISORDER, SINGLE EPISODE, UNSPECIFIED: Chronic | Status: ACTIVE | Noted: 2019-03-06

## 2019-03-18 PROBLEM — F10.129 ALCOHOL ABUSE WITH INTOXICATION, UNSPECIFIED: Chronic | Status: ACTIVE | Noted: 2019-03-06

## 2019-03-19 ENCOUNTER — OTHER (OUTPATIENT)
Age: 74
End: 2019-03-19

## 2019-03-19 PROBLEM — Z00.00 ENCOUNTER FOR PREVENTIVE HEALTH EXAMINATION: Status: ACTIVE | Noted: 2019-03-19

## 2019-03-22 ENCOUNTER — INBOUND DOCUMENT (OUTPATIENT)
Age: 74
End: 2019-03-22

## 2019-03-25 PROBLEM — S32.010A CLOSED COMPRESSION FRACTURE OF L1 VERTEBRA: Status: ACTIVE | Noted: 2019-03-25

## 2019-03-26 ENCOUNTER — INBOUND DOCUMENT (OUTPATIENT)
Age: 74
End: 2019-03-26

## 2019-03-28 ENCOUNTER — APPOINTMENT (OUTPATIENT)
Dept: ORTHOPEDIC SURGERY | Facility: CLINIC | Age: 74
End: 2019-03-28
Payer: MEDICARE

## 2019-03-28 DIAGNOSIS — S32.010A WEDGE COMPRESSION FRACTURE OF FIRST LUMBAR VERTEBRA, INITIAL ENCOUNTER FOR CLOSED FRACTURE: ICD-10-CM

## 2019-03-28 PROCEDURE — 22310 CLOSED TX VERT FX W/O MANJ: CPT | Mod: 79

## 2019-03-28 PROCEDURE — 99214 OFFICE O/P EST MOD 30 MIN: CPT | Mod: 57

## 2019-03-28 NOTE — DISCUSSION/SUMMARY
[de-identified] : Secondary to patient having no complaints of of back pain, his TLSO brace at this point in time, is not indicated. He may continue weight bearing as tolerated in regards to the lumbar compression fracture. Based on the fact that he has no back pain, he may follow up on a prn basis.

## 2019-03-28 NOTE — HISTORY OF PRESENT ILLNESS
[de-identified] : 73 year old male presents for fracture care follow up to an L1 compression fracture. He was recently hospitalized following a fall off of his couch. He was seen at [facility], and was told he needs to use a TLSO brace. Patient currently has no pain or symptoms at this time, and is not wearing the brace.  He is here today to discuss the use of the brace.  [Ataxia] : no ataxia [Incontinence] : no incontinence [Loss of Dexterity] : good dexterity [Urinary Ret.] : no urinary retention

## 2019-03-28 NOTE — ADDENDUM
[FreeTextEntry1] : Documented by Cleo Bertrand acting as a scribe for Dr. Ishan Bowman on 03/28/2019. All medical record entries made by the Scribe were at my, Dr. Ishan Bowman, direction and personally dictated by me on 03/28/2019. I have reviewed the chart and agree that the record accurately reflects my personal performance of the history, physical exam, assessment and plan. I have also personally directed, reviewed, and agreed with the chart.

## 2019-04-01 ENCOUNTER — OTHER (OUTPATIENT)
Age: 74
End: 2019-04-01

## 2019-04-01 ENCOUNTER — APPOINTMENT (OUTPATIENT)
Dept: ORTHOPEDIC SURGERY | Facility: CLINIC | Age: 74
End: 2019-04-01
Payer: MEDICARE

## 2019-04-01 DIAGNOSIS — Z87.81 PERSONAL HISTORY OF (HEALED) TRAUMATIC FRACTURE: ICD-10-CM

## 2019-04-01 PROCEDURE — 73502 X-RAY EXAM HIP UNI 2-3 VIEWS: CPT | Mod: RT

## 2019-04-01 PROCEDURE — 99024 POSTOP FOLLOW-UP VISIT: CPT

## 2019-04-01 NOTE — HISTORY OF PRESENT ILLNESS
[___ Weeks Post Op] : [unfilled] weeks post op [Xray (Date:___)] : [unfilled] Xray -  [Clean/Dry/Intact] : clean, dry and intact [Healed] : healed [Erythema] : not erythematous [Discharge] : absent of discharge [Swelling] : not swollen [Dehiscence] : not dehisced [Callus Formation] : callus formation [Hardware in Good Position] : hardware in good position [Good Overall Alignment] : good overall alignment [Doing Well] : is doing well [Excellent Pain Control] : has excellent pain control [No Sign of Infection] : is showing no signs of infection [de-identified] : Closed reduction and percutaneous pinning of right femoral neck fracture. DOS:03/08/19 [de-identified] : The patient is a 73-year-old gentleman presents for 4 weeks postoperative f/u CRPP right hip.  No pain currently. He just started physical therapy. He smells heavily of cigarettes today. [de-identified] : Physical Exam:\par General: Well appearing, no acute distress, A&O\par Neurologic: A&Ox3, No focal deficits\par Head: NCAT without abrasions, lacerations, or ecchymosis to head, face, or scalp\par Respiratory: Equal chest wall expansion bilaterally, no accessory muscle use\par Lymphatic: No lymphadenopathy palpated\par Skin: Warm and dry\par Psychiatric: Normal mood and affect [de-identified] : 2 views right hip [de-identified] : No restrictions today. Weightbearing as tolerated. As long as he does well, he may followup p.r.n. The patient was given the opportunity to ask questions and all questions were answered to their satisfaction.\par \par Abisai Medina MD\par Orthopaedic Trauma Surgeon\par [facility]\par [facility] [place]\par \par \par \par

## 2020-01-01 ENCOUNTER — INPATIENT (INPATIENT)
Facility: HOSPITAL | Age: 75
LOS: 0 days | End: 2020-04-17
Attending: INTERNAL MEDICINE | Admitting: INTERNAL MEDICINE

## 2020-01-01 ENCOUNTER — OUTPATIENT (OUTPATIENT)
Dept: OUTPATIENT SERVICES | Facility: HOSPITAL | Age: 75
LOS: 1 days | End: 2020-01-01

## 2020-01-01 ENCOUNTER — INPATIENT (INPATIENT)
Facility: HOSPITAL | Age: 75
LOS: 2 days | Discharge: NURSING, REHAB OR LONG-TERM CARE | End: 2020-04-16
Admitting: INTERNAL MEDICINE
Payer: OTHER GOVERNMENT

## 2020-01-01 DIAGNOSIS — Z98.890 OTHER SPECIFIED POSTPROCEDURAL STATES: Chronic | ICD-10-CM

## 2020-01-01 PROCEDURE — 71045 X-RAY EXAM CHEST 1 VIEW: CPT | Mod: 26

## 2020-01-01 PROCEDURE — 99255 IP/OBS CONSLTJ NEW/EST HI 80: CPT

## 2020-01-01 PROCEDURE — 99232 SBSQ HOSP IP/OBS MODERATE 35: CPT

## 2020-01-01 PROCEDURE — 99285 EMERGENCY DEPT VISIT HI MDM: CPT

## 2021-06-26 NOTE — PROGRESS NOTE ADULT - GASTROINTESTINAL COMMENTS
· Patient with weakness and AMS  · Found to have evidence of UTI on UA , rocephin for now  · S/p 1L LR , now with fluids running 100ml/h for 12 hours overnight  · Urine culture is pending  · No leukocytosis, afebrile large left inguinal hernia, nontender

## 2022-04-25 NOTE — PROVIDER CONTACT NOTE (MEDICATION) - ACTION/TREATMENT ORDERED:
Hold amiodarone, and I will let my team know. Banner Transposition Flap Text: The defect edges were debeveled with a #15 scalpel blade.  Given the location of the defect and the proximity to free margins a Banner transposition flap was deemed most appropriate.  Using a sterile surgical marker, an appropriate flap drawn around the defect. The area thus outlined was incised deep to adipose tissue with a #15 scalpel blade.  The skin margins were undermined to an appropriate distance in all directions utilizing iris scissors.

## 2025-08-01 NOTE — PROVIDER CONTACT NOTE (OTHER) - SITUATION
Writer Pt voided scant amount, bladder scan at 2248 was >298 and now scan shows >971